# Patient Record
Sex: FEMALE | Race: WHITE | Employment: FULL TIME | URBAN - METROPOLITAN AREA
[De-identification: names, ages, dates, MRNs, and addresses within clinical notes are randomized per-mention and may not be internally consistent; named-entity substitution may affect disease eponyms.]

---

## 2018-06-14 ENCOUNTER — OFFICE VISIT (OUTPATIENT)
Dept: ORTHOPEDIC SURGERY | Age: 28
End: 2018-06-14

## 2018-06-14 VITALS
HEART RATE: 92 BPM | WEIGHT: 142.4 LBS | DIASTOLIC BLOOD PRESSURE: 84 MMHG | OXYGEN SATURATION: 90 % | HEIGHT: 62 IN | SYSTOLIC BLOOD PRESSURE: 143 MMHG | BODY MASS INDEX: 26.2 KG/M2

## 2018-06-14 DIAGNOSIS — Q79.60 EHLERS-DANLOS DISEASE: Primary | ICD-10-CM

## 2018-06-14 RX ORDER — IBUPROFEN 600 MG/1
TABLET ORAL
COMMUNITY

## 2018-06-14 RX ORDER — LEVALBUTEROL TARTRATE 45 UG/1
AEROSOL, METERED ORAL
COMMUNITY

## 2018-06-14 RX ORDER — LORATADINE 10 MG/1
10 TABLET ORAL
COMMUNITY

## 2018-06-14 RX ORDER — ALBUTEROL SULFATE 90 UG/1
AEROSOL, METERED RESPIRATORY (INHALATION)
COMMUNITY

## 2018-06-14 RX ORDER — MONTELUKAST SODIUM 10 MG/1
10 TABLET ORAL DAILY
COMMUNITY

## 2018-06-14 NOTE — PROGRESS NOTES
Patient: Sneha Pelletier                MRN: [id number]       SSN: [id number]  YOB: 1990        AGE: 32 y.o. SEX: female  Body mass index is 26.05 kg/(m^2). PCP: Zari Aceves MD  06/14/18    Chief Complaint: Multiple joint complaints    HISTORY OF PRESENT ILLNESS:  Jayme Meza is a 32year old female who comes in today with multiple joint complaints. She says at times she has pain in her ankles, knees, as well as shoulders and elbows. She said she's always had some history even growing up with spraining and straining her joints. She denies any specific trauma, but she says at times it feels like her kneecap is popping out of place. She also feels like her elbows are popping at times, as well as her shoulder. Today she also has some ankle pain on the right side. She sees a chiropractor extensively and has issues with her back, which chiropractic care does help. She also does have daily headaches and migraines that also are worked on by her chiropractor. She is here today for opinion on what to do about her multiple joint complaints, sprains and strains. She has not had any xrays or blood work done. Past Medical History:   Diagnosis Date    Asthma     Chronic headaches        History reviewed. No pertinent family history. Current Outpatient Prescriptions   Medication Sig Dispense Refill    montelukast (SINGULAIR) 10 mg tablet Take 10 mg by mouth daily.  loratadine (CLARITIN) 10 mg tablet Take 10 mg by mouth.  L-norgest/e.estradiol-e.estrad (ASHLYNA PO) Take  by mouth.  levalbuterol tartrate (XOPENEX) 45 mcg/actuation inhaler Take  by inhalation.  albuterol (PROVENTIL HFA, VENTOLIN HFA, PROAIR HFA) 90 mcg/actuation inhaler Take  by inhalation.  Lactobacillus acidophilus (PROBIOTIC PO) Take  by mouth.  TURMERIC, BULK, by Does Not Apply route.  ibuprofen (MOTRIN) 600 mg tablet Take  by mouth every six (6) hours as needed for Pain.       acetaminophen-caffeine 500-65 mg (EXCEDRINE TENSION HEADACHE) 500-65 mg tab Take 2 Tabs by mouth as needed for Headache. Allergies   Allergen Reactions    Clarithromycin Anaphylaxis       History reviewed. No pertinent surgical history. Social History     Social History    Marital status: UNKNOWN     Spouse name: N/A    Number of children: N/A    Years of education: N/A     Occupational History    Not on file. Social History Main Topics    Smoking status: Never Smoker    Smokeless tobacco: Never Used    Alcohol use Not on file    Drug use: Not on file    Sexual activity: Not on file     Other Topics Concern    Not on file     Social History Narrative    No narrative on file       REVIEW OF SYSTEMS:      CON: negative for recent weight loss/gain, fever, or chills  EYE: negative for double or blurry vision  ENT: negative for hoarseness  RS:   negative for cough, URI, SOB  CV:  negative for chest pain, palpitations  GI:    negative for blood in stool, nausea/vomiting  :  negative for blood in urine  MS: As per HPI  Other systems reviewed and noted below. PHYSICAL EXAMINATION:  Visit Vitals    /84 (BP 1 Location: Left arm, BP Patient Position: Sitting)    Pulse 92    Ht 5' 2\" (1.575 m)    Wt 142 lb 6.4 oz (64.6 kg)    SpO2 90%    BMI 26.05 kg/m2     Body mass index is 26.05 kg/(m^2). GENERAL: Alert and oriented x3, in no acute distress, well-developed, well-nourished. HEENT: Normocephalic, atraumatic. RESP: Non labored breathing with equal chest rise on inspiration. CV: Well perfused extremities. No cyanosis or clubbing noted. ABDOMEN: Soft, non-tender, non-distended. PHYSICAL EXAMINATION:  Examination of her bilateral shoulders reveal some laxity without pain. I'm unable to dislocate her shoulders although she has anterior and posterior subluxations. She also has some inferior instability without dung dislocation. None of this is painful.   She has excellent range of motion with full forward flexion to 180 degrees, external rotation of approximately 80 degrees and internal rotations of the lower cervical spine. This is bilaterally. Her strength is 5/5. She has hyperextension of approximately 10 degrees in both elbows. There is no elbow instability noted. She is non tender to palpation of both elbows. For her knees she has full range of motion from full flexion to full extension. She does not hyperextend her knees. She has full strength and range of motion. She does have some lateral subluxation of her patella bilaterally without dislocation. On her left her thumb is able to touch her forearm, but not on the right, and in both the left and right hand she has hyperextension of the small finger greater than 90 degrees. She is able to put both palms flat on the floor when doing a hamstring stretch and touching the floor. Her Beighton score is 6/9. ASSESSMENT AND PLAN:  Rich Perez is a 32year old female with multiple complaints. 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 I discussed with her what that entails, as well as the treatment options for it. Her ligaments are lax, which leads her joints to be somewhat unstable and this can lead to pain. The treatment for this is generally extensive therapy to work on strengthening the stabilizers of the joints. Due to her soft tissues being hyper lax, we talked about any sort of surgery being a last resort, as those can fail as the tissue stretches back out. She understands this. I have ordered some basic lab work at this point. I ordered that today and I would like to see her back after it is done. All of her questions were answered.           Electronically signed by: Deidra Devries MD

## 2018-06-21 ENCOUNTER — TELEPHONE (OUTPATIENT)
Dept: ORTHOPEDIC SURGERY | Facility: CLINIC | Age: 28
End: 2018-06-21

## 2018-06-21 DIAGNOSIS — Q79.60 EHLERS-DANLOS DISEASE: ICD-10-CM

## 2018-06-21 NOTE — TELEPHONE ENCOUNTER
Pt returned call_ states her labs were done the samw day of her appt on 6/14/18 at the [facility] lab at [address] [place] [address] [facility] lab [place] location [phone]    *Please call pt to confirm labs rec'vd since her appt is tomorrow [phone]

## 2018-06-22 ENCOUNTER — OFFICE VISIT (OUTPATIENT)
Dept: ORTHOPEDIC SURGERY | Facility: CLINIC | Age: 28
End: 2018-06-22

## 2018-06-22 VITALS
SYSTOLIC BLOOD PRESSURE: 106 MMHG | DIASTOLIC BLOOD PRESSURE: 70 MMHG | TEMPERATURE: 98.3 F | HEIGHT: 62 IN | RESPIRATION RATE: 16 BRPM | HEART RATE: 95 BPM | BODY MASS INDEX: 26.13 KG/M2 | WEIGHT: 142 LBS

## 2018-06-22 DIAGNOSIS — Q79.60 EHLERS-DANLOS DISEASE: Primary | ICD-10-CM

## 2018-06-22 RX ORDER — FLUTICASONE PROPIONATE 50 MCG
2 SPRAY, SUSPENSION (ML) NASAL DAILY
COMMUNITY

## 2018-06-22 NOTE — PROGRESS NOTES
Patient: Korin Iglesias                MRN: [id number]       SSN: [id number]  YOB: 1990        AGE: 25 y.o. SEX: female  Body mass index is 25.97 kg/(m^2). PCP: Stan Bird MD  06/22/18    Chief Complaint: No changes from previous visit complaint. HISTORY OF PRESENT ILLNESS:  [facility] returns today for follow up of her previous visit as well as her blood work. She has no new complaints. Past Medical History:   Diagnosis Date    Asthma     Chronic headaches        History reviewed. No pertinent family history. Current Outpatient Prescriptions   Medication Sig Dispense Refill    fluticasone (FLONASE ALLERGY RELIEF) 50 mcg/actuation nasal spray 2 Sprays by Both Nostrils route daily.  montelukast (SINGULAIR) 10 mg tablet Take 10 mg by mouth daily.  loratadine (CLARITIN) 10 mg tablet Take 10 mg by mouth.  L-norgest/e.estradiol-e.estrad (ASHLYNA PO) Take  by mouth.  levalbuterol tartrate (XOPENEX) 45 mcg/actuation inhaler Take  by inhalation.  albuterol (PROVENTIL HFA, VENTOLIN HFA, PROAIR HFA) 90 mcg/actuation inhaler Take  by inhalation.  Lactobacillus acidophilus (PROBIOTIC PO) Take  by mouth.  TURMERIC, BULK, by Does Not Apply route.  ibuprofen (MOTRIN) 600 mg tablet Take  by mouth every six (6) hours as needed for Pain.  acetaminophen-caffeine 500-65 mg (EXCEDRINE TENSION HEADACHE) 500-65 mg tab Take 2 Tabs by mouth as needed for Headache. Allergies   Allergen Reactions    Clarithromycin Anaphylaxis       History reviewed. No pertinent surgical history. Social History     Social History    Marital status: UNKNOWN     Spouse name: N/A    Number of children: N/A    Years of education: N/A     Occupational History    Not on file.      Social History Main Topics    Smoking status: Never Smoker    Smokeless tobacco: Never Used    Alcohol use No    Drug use: Not on file    Sexual activity: Not on file Other Topics Concern    Not on file     Social History Narrative       REVIEW OF SYSTEMS:      No changes from previous review of systems unless noted. PHYSICAL EXAMINATION:  Visit Vitals    /70 (BP 1 Location: Left arm, BP Patient Position: Sitting)    Pulse 95    Temp 98.3 °F (36.8 °C) (Oral)    Resp 16    Ht 5' 2\" (1.575 m)    Wt 142 lb (64.4 kg)    BMI 25.97 kg/m2     Body mass index is 25.97 kg/(m^2). GENERAL: Alert and oriented x3, in no acute distress. HEENT: Normocephalic, atraumatic. RESP: Non labored breathing. SKIN: No rashes or lesions noted. PHYSICAL EXAMINATION:  Musculoskeletal examination is mostly unchanged from her previous visit. She does have some pain with shorter range of motion as well as knee range of motion. No instability is appreciated with either of her shoulders as well as either of her patellae. She ambulates with a normal gait. STUDIES:  Lab work was reviewed with her today at length. Of note, her CRP was slightly elevated at 8. White cell count was normal at 6.6. SED rate was normal at 11. Her lupus anticoagulant panel was negative. Rheumatoid factor was negative. Her antinuclear antibodies were also negative. ASSESSMENT AND PLAN:  Erwin is a 25-year-old female with what I believe to be a connective tissue disorder such as Rimma Postal syndrome which is leading to multiple joint pains. I discussed with her that moving forward I have recommended physical therapy to work on this stabilizing the shoulder and the knees. I have ordered this today. I have also written her a referral to see a  to have genetic testing to see if she does have any underlying genetic abnormalities leading to this syndrome. I would like to see her back in about two months after she has had physical therapy, and, hopefully, after she had seen a .  All of her questions were answered today.          Electronically signed by: Saman Her MD

## 2018-07-03 ENCOUNTER — TELEPHONE (OUTPATIENT)
Dept: ORTHOPEDIC SURGERY | Facility: CLINIC | Age: 28
End: 2018-07-03

## 2018-07-03 NOTE — TELEPHONE ENCOUNTER
Darlyn from [facility] called for Dr. Chi Peralta. Darlyn said that they received a referral on the patient from Kenna Preston. Darlyn said they do not see Adults. Darlyn tel. [phone].

## 2018-07-03 NOTE — TELEPHONE ENCOUNTER
Ronnie Greene,    Would you be able to contact the department at [facility] and ask them who they refer adults to for genetic testing/counseling once the patients are no longer able to be seen as children. This patient needs to see a  for possible genetic workup for her connective tissue disorder.     Thank you

## 2018-07-09 NOTE — TELEPHONE ENCOUNTER
PATIENT CONTACTED:  THERE ARE NO ADULT GENETICS PROVIDERS IN [facility]. I HAVE PLACED A CALL TO THE GENETICS DEPT AT [facility] IN [place] AND AM WAITING FOR A CALL BACK TO SCHEDULE AN APPOINTMENT. MS. STERLING IS IN AGREEMENT WITH THIS PLAN AND I WILL CONTACT HER AGAIN ONCE I HAVE MORE INFORMATION.

## 2018-07-11 ENCOUNTER — HOSPITAL ENCOUNTER (OUTPATIENT)
Dept: PHYSICAL THERAPY | Age: 28
Discharge: HOME | End: 2018-07-11
Payer: COMMERCIAL

## 2018-07-11 PROCEDURE — 97162 PT EVAL MOD COMPLEX 30 MIN: CPT

## 2018-07-11 PROCEDURE — 97110 THERAPEUTIC EXERCISES: CPT

## 2018-07-11 PROCEDURE — 97530 THERAPEUTIC ACTIVITIES: CPT
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
## 2018-07-17 ENCOUNTER — HOSPITAL ENCOUNTER (OUTPATIENT)
Dept: PHYSICAL THERAPY | Age: 28
Discharge: HOME | End: 2018-07-17
Payer: COMMERCIAL

## 2018-07-17 PROCEDURE — 97110 THERAPEUTIC EXERCISES: CPT

## 2018-07-17 PROCEDURE — 97112 NEUROMUSCULAR REEDUCATION: CPT

## 2018-07-17 NOTE — PROGRESS NOTES
PT DAILY TREATMENT NOTE - [facility]     Patient Name: Martin Whatley  Date:2018  : 1990  [x]  Patient  Verified  Payor: BLUE CROSS / Plan: [employer] 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 Notes she thinks its the weather. OBJECTIVE      25 min Therapeutic Exercise:  [x] See flow sheet :   Rationale: increase strength and improve coordination to improve the patients ability to increase ADLs    27 min Neuromuscular Re-education:  [x]  See flow sheet : scapular stabilization exercises, deep core stabilization and standing balance exercises   Rationale: improve coordination, improve balance and increase proprioception  to improve the patients ability to increase ease with work related activities               With   [] TE   [] TA   [] neuro   [] other: Patient Education: [x] Review HEP    [] Progressed/Changed HEP based on:   [] positioning   [] body mechanics   [] transfers   [] heat/ice application    [] other:           Pain Level (0-10 scale) post treatment: shoulder 4, knee 3    ASSESSMENT/Changes in Function: pt tolerated today's session well without any increase in pain. Pt requires verbal cues to correct knee valgus while on shuttle press. Reviewed HEP.     Patient will continue to benefit from skilled PT services to modify and progress therapeutic interventions, address functional mobility deficits, address ROM deficits, address strength deficits, analyze and address soft tissue restrictions and analyze and cue movement patterns to attain remaining goals. [x]  See Plan of Care  []  See progress note/recertification  []  See Discharge Summary         Progress towards goals / Updated goals:  Short Term Goals: To be accomplished in 3 weeks:  1. Patient will subjectively report full compliance with prescribed HEP. Eval: HEP provided  Current: Progressing, initiated HEP. Does not report full compliance, 7/17/18  2. Patient will demonstrate left/right hip ER MMT >/=4+/5 to improve ease with ambulation on uneven ground. Eval: Left Hip ER = 3+/5, Right Hip ER = 4/5  3. Patient will demonstrate left/right shoulder flexion MMT 5/5 to improve ease with overhead lifting. Eval: Left Shoulder Flexion = 4/5, Right Shoulder Flexion 4/5     Long Term Goals: To be accomplished in 6 weeks:  1. Patient will demonstrate a significant functional improvement as demonstrated by a score of >/= 71 on shoulder FOTO and 62 on knee FOTO. Eval: Shoulder FOTO = 68, Knee FOTO = 50  2. Patient will demonstrate left/right rhomboid, lower trapezius and middle trapezius MMT >/=4/5 to improve ease with functional lifting. Eval: Middle Trapezius: Left 2+/5, Right 2+/5, Lower Trapezius: Left 3+/5, Right 3+/5, Rhomboids: Left 3+/5, Right 3+/5  3. Patient will demonstrate ability to perform prone plank x45 seconds with maintenance of correct form to improve ease with yoga routine.   Eval: NT    PLAN  [x]  Upgrade activities as tolerated     [x]  Continue plan of care  []  Update interventions per flow sheet       []  Discharge due to:_  []  Other:_      Jordi Kohli 7/17/2018  4:56 PM    Future Appointments  Date Time Provider Tena Dickerson   7/17/2018 5:00 PM Jordi Kohli [facility]   7/20/2018 4:30 PM [facility]   7/23/2018 5:00 PM [facility]ordi [facility]   7/26/2018 4:30 PM Hao Hyman, PTA [facility]   7/30/2018 4:30 PM Ryley Earnest [facility]   8/2/2018 4:30 PM [facility], PTA [facility]   8/6/2018 4:00 PM [facility], PTA [facility]   8/9/2018 4:00 PM [place] Earnest [facility]   8/13/2018 4:00 PM [place] Earnest [facility]   8/16/2018 4:00 PM [facility], PTA [facility]   8/22/2018 3:30 PM Agatha Du MD [facility] [address]

## 2018-07-20 ENCOUNTER — APPOINTMENT (OUTPATIENT)
Dept: PHYSICAL THERAPY | Age: 28
End: 2018-07-20
Payer: COMMERCIAL

## 2018-07-23 ENCOUNTER — HOSPITAL ENCOUNTER (OUTPATIENT)
Dept: PHYSICAL THERAPY | Age: 28
End: 2018-07-23
Payer: COMMERCIAL

## 2018-07-26 ENCOUNTER — HOSPITAL ENCOUNTER (OUTPATIENT)
Dept: PHYSICAL THERAPY | Age: 28
Discharge: HOME | End: 2018-07-26
Payer: COMMERCIAL

## 2018-07-26 PROCEDURE — 97112 NEUROMUSCULAR REEDUCATION: CPT

## 2018-07-26 PROCEDURE — 97110 THERAPEUTIC EXERCISES: CPT

## 2018-07-26 NOTE — PROGRESS NOTES
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      OBJECTIVE        Min Type Additional Details    [] Estim:  []Unatt       []IFC  []Premod                        []Other:  []w/ice   []w/heat  Position:  Location:    [] Estim: []Att    []TENS instruct  []NMES                    []Other:  []w/US   []w/ice   []w/heat  Position:  Location:    []  Traction: [] Cervical       []Lumbar                       [] Prone          []Supine                       []Intermittent   []Continuous Lbs:  [] before manual  [] after manual    []  Ultrasound: []Continuous   [] Pulsed                           []1MHz   []3MHz W/cm2:  Location:    []  Iontophoresis with dexamethasone         Location: [] Take home patch   [] In clinic    []  Ice     []  heat  []  Ice massage  []  Laser   []  Anodyne Position:  Location:    []  Laser with stim  []  Other:  Position:  Location:    []  Vasopneumatic Device Pressure:       [] lo [] med [] hi   Temperature: [] lo [] med [] hi   [] Skin assessment post-treatment:  []intact []redness- no adverse reaction    []redness - adverse reaction:       35 min Therapeutic Exercise:  [x] See flow sheet :   Rationale: increase ROM and increase strength to improve the patients ability to increase tolerance to activities. 10 min Neuromuscular Re-education:  [x]  See flow sheet :quad activation and scapular stabilization. Rationale: increase ROM and increase strength  to improve the patients ability to increase ease with ADLs. With   [] TE   [] TA   [] neuro   [] other: Patient Education: [x] Review HEP    [] Progressed/Changed HEP based on:   [] positioning   [] body mechanics   [] transfers   [] heat/ice application    [] other:      Other Objective/Functional Measures: B shoulder flexion 4+/5      Pain Level (0-10 scale) post treatment: shoulder 2; knee 3    ASSESSMENT/Changes in Function: Pt reports that exercises with her left UE are more difficulty than right UE. Patient will continue to benefit from skilled PT services to modify and progress therapeutic interventions, address functional mobility deficits, address ROM deficits, address strength deficits and analyze and address soft tissue restrictions to attain remaining goals. []  See Plan of Care  []  See progress note/recertification  []  See Discharge Summary         Progress towards goals / Updated goals:  Short Term Goals: To be accomplished in 3 weeks:  1. Patient will subjectively report full compliance with prescribed HEP. Eval: HEP provided  Current: Progressing, initiated HEP. Does not report full compliance, 7/17/18  2. Patient will demonstrate left/right hip ER MMT >/=4+/5 to improve ease with ambulation on uneven ground. Eval: Left Hip ER = 3+/5, Right Hip ER = 4/5  3. Patient will demonstrate left/right shoulder flexion MMT 5/5 to improve ease with overhead lifting. Eval: Left Shoulder Flexion = 4/5, Right Shoulder Flexion 4/5  Current: Progressing: B shoulder flexion 4+/5 7/26/18      Long Term Goals: To be accomplished in 6 weeks:  1.  Patient will demonstrate a significant functional improvement as demonstrated by a score of >/= 71 on shoulder FOTO and 62 on knee FOTO. Eval: Shoulder FOTO = 68, Knee FOTO = 50  2. Patient will demonstrate left/right rhomboid, lower trapezius and middle trapezius MMT >/=4/5 to improve ease with functional lifting. Eval: Middle Trapezius: Left 2+/5, Right 2+/5, Lower Trapezius: Left 3+/5, Right 3+/5, Rhomboids: Left 3+/5, Right 3+/5  3. Patient will demonstrate ability to perform prone plank x45 seconds with maintenance of correct form to improve ease with yoga routine.   Eval: NT    PLAN  []  Upgrade activities as tolerated     [x]  Continue plan of care  []  Update interventions per flow sheet       []  Discharge due to:_  []  Other:_      Vivek Clonts, PTA 7/26/2018  4:25 PM    Future Appointments  Date Time Provider Tena Dickerson   7/26/2018 4:30 PM Vivek Clonts, PTA [facility]   7/31/2018 4:00 PM [place] Feliciano [facility]   8/2/2018 4:30 PM Vivek Clonts, PTA [facility]   8/6/2018 4:00 PM Vivek Clonts, PTA [facility]   8/9/2018 4:00 PM [place] Feliciano [facility]   8/13/2018 4:00 PM [place] Feliciano [facility]   8/16/2018 4:00 PM Vivek Clonts, PTA [facility]   8/22/2018 3:30 PM Rohini Greene MD [facility] [address]

## 2018-07-30 ENCOUNTER — APPOINTMENT (OUTPATIENT)
Dept: PHYSICAL THERAPY | Age: 28
End: 2018-07-30
Payer: COMMERCIAL

## 2018-07-31 ENCOUNTER — HOSPITAL ENCOUNTER (OUTPATIENT)
Dept: PHYSICAL THERAPY | Age: 28
Discharge: HOME | End: 2018-07-31
Payer: COMMERCIAL

## 2018-07-31 PROCEDURE — 97110 THERAPEUTIC EXERCISES: CPT

## 2018-07-31 PROCEDURE — 97112 NEUROMUSCULAR REEDUCATION: CPT

## 2018-07-31 NOTE — PROGRESS NOTES
PT DAILY TREATMENT NOTE - [facility]     Patient Name: Rajiv Quevedo  Date:2018  : 1990  [x]  Patient  Verified  Payor: BLUE CROSS / Plan: [employer] 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 OBJECTIVE    29 min Therapeutic Exercise:  [x] See flow sheet :   Rationale: increase ROM, increase strength and improve coordination to improve the patients ability to increase ease with functional activities     20 min Neuromuscular Re-education:  [x]  See flow sheet : quad activation and scapular stability exercises    Rationale: improve coordination, improve balance and increase proprioception  to improve the patients ability to increase ease with ADls          With   [] TE   [] TA   [] neuro   [] other: Patient Education: [x] Review HEP    [] Progressed/Changed HEP based on:   [] positioning   [] body mechanics   [] transfers   [] heat/ice application    [] other:         Pain Level (0-10 scale) post treatment: shoulder 1 knee 3    ASSESSMENT/Changes in Function:  Pt continues to report increased difficulty performing exercises with her left arm than her right. Progressed strengthening exercises requiring minimal cues for form. Reviewed HEP at the end of today's treatment.     Patient will continue to benefit from skilled PT services to modify and progress therapeutic interventions, address functional mobility deficits, address ROM deficits, address strength deficits, analyze and cue movement patterns and assess and modify postural abnormalities to attain remaining goals. [x]  See Plan of Care  []  See progress note/recertification  []  See Discharge Summary         Progress towards goals / Updated goals:  Short Term Goals: To be accomplished in 3 weeks:  1. Patient will subjectively report full compliance with prescribed HEP. Eval: HEP provided  Current: Progressing, initiated HEP. Does not report full compliance, 7/17/18  2. Patient will demonstrate left/right hip ER MMT >/=4+/5 to improve ease with ambulation on uneven ground. Eval: Left Hip ER = 3+/5, Right Hip ER = 4/5  Current: Progressing left hip ER= 4-/5, right hip ER= 4/5, 7/31/18  3. Patient will demonstrate left/right shoulder flexion MMT 5/5 to improve ease with overhead lifting. Eval: Left Shoulder Flexion = 4/5, Right Shoulder Flexion 4/5  Current: Progressing: B shoulder flexion 4+/5 7/26/18      Long Term Goals: To be accomplished in 6 weeks:  1. Patient will demonstrate a significant functional improvement as demonstrated by a score of >/= 71 on shoulder FOTO and 62 on knee FOTO. Eval: Shoulder FOTO = 68, Knee FOTO = 50  2. Patient will demonstrate left/right rhomboid, lower trapezius and middle trapezius MMT >/=4/5 to improve ease with functional lifting. Eval: Middle Trapezius: Left 2+/5, Right 2+/5, Lower Trapezius: Left 3+/5, Right 3+/5, Rhomboids: Left 3+/5, Right 3+/5  3. Patient will demonstrate ability to perform prone plank x45 seconds with maintenance of correct form to improve ease with yoga routine.   Eval: NT       PLAN  [x]  Upgrade activities as tolerated     [x]  Continue plan of care  []  Update interventions per flow sheet       []  Discharge due to:_  []  Other:_      Jordi Kohli 7/31/2018  3:58 PM    Future Appointments  Date Time Provider Tena Dickerson   7/31/2018 4:00 PM Jordi Kohli [facility]   8/2/2018 4:30 PM Toan Art, PTA [facility]   8/6/2018 4:00 PM Omaira Mcguire [facility]   8/9/2018 4:00 PM Taqueria Conroy [facility]   8/13/2018 4:00 PM Taqueria Conroy [facility]   8/16/2018 4:00 PM Toan Gary PTA [facility]   8/22/2018 3:30 PM Hardik Mendes MD [facility] Sh[facility]

## 2018-08-02 ENCOUNTER — HOSPITAL ENCOUNTER (OUTPATIENT)
Dept: PHYSICAL THERAPY | Age: 28
Discharge: HOME | End: 2018-08-02
Payer: COMMERCIAL

## 2018-08-02 PROCEDURE — 97112 NEUROMUSCULAR REEDUCATION: CPT

## 2018-08-02 PROCEDURE — 97110 THERAPEUTIC EXERCISES: CPT

## 2018-08-02 NOTE — PROGRESS NOTES
PT DAILY TREATMENT NOTE - 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      OBJECTIVE    Modality rationale:    Min Type Additional Details    [] Estim:  []Unatt       []IFC  []Premod                        []Other:  []w/ice   []w/heat  Position:  Location:    [] Estim: []Att    []TENS instruct  []NMES                    []Other:  []w/US   []w/ice   []w/heat  Position:  Location:    []  Traction: [] Cervical       []Lumbar                       [] Prone          []Supine                       []Intermittent   []Continuous Lbs:  [] before manual  [] after manual    []  Ultrasound: []Continuous   [] Pulsed                           []1MHz   []3MHz W/cm2:  Location:    []  Iontophoresis with dexamethasone         Location: [] Take home patch   [] In clinic    []  Ice     []  heat  []  Ice massage  []  Laser   []  Anodyne Position:  Location:    []  Laser with stim  []  Other:  Position:  Location:    []  Vasopneumatic Device Pressure:       [] lo [] med [] hi   Temperature: [] lo [] med [] hi   [] Skin assessment post-treatment:  []intact []redness- no adverse reaction    []redness - adverse reaction:        43 min Therapeutic Exercise:  [x] See flow sheet :   Rationale: increase ROM and increase strength to improve the patients ability to increase tolerance to activities.      10 min Neuromuscular Re-education:  [x]  See flow sheet :quad activation and scapular stabilization. Rationale: increase ROM and increase strength  to improve the patients ability to increase ease with ADLs.           With   [] TE   [] TA   [] neuro   [] other: Patient Education: [x] Review HEP    [] Progressed/Changed HEP based on:   [] positioning   [] body mechanics   [] transfers   [] heat/ice application    [] other:      Other Objective/Functional Measures:     Pain Level (0-10 scale) post treatment: knee 1-2; shoulder 3    ASSESSMENT/Changes in Function: Pt tolerated an increase in reps and resistance well. Patient will continue to benefit from skilled PT services to modify and progress therapeutic interventions, address functional mobility deficits, address ROM deficits, address strength deficits and analyze and address soft tissue restrictions to attain remaining goals. []  See Plan of Care  []  See progress note/recertification  []  See Discharge Summary         Progress towards goals / Updated goals:    Short Term Goals: To be accomplished in 3 weeks:  1. Patient will subjectively report full compliance with prescribed HEP. Eval: HEP provided  Current: Progressing, initiated HEP. Does not report full compliance, 7/17/18  2. Patient will demonstrate left/right hip ER MMT >/=4+/5 to improve ease with ambulation on uneven ground. Eval: Left Hip ER = 3+/5, Right Hip ER = 4/5  Current: Progressing left hip ER= 4-/5, right hip ER= 4/5, 7/31/18  3. Patient will demonstrate left/right shoulder flexion MMT 5/5 to improve ease with overhead lifting. Eval: Left Shoulder Flexion = 4/5, Right Shoulder Flexion 4/5  Current: Progressing: B shoulder flexion 4+/5 7/26/18      Long Term Goals: To be accomplished in 6 weeks:  1.  Patient will demonstrate a significant functional improvement as demonstrated by a score of >/= 71 on shoulder FOTO and 62 on knee FOTO. Eval: Shoulder FOTO = 68, Knee FOTO = 50  2. Patient will demonstrate left/right rhomboid, lower trapezius and middle trapezius MMT >/=4/5 to improve ease with functional lifting. Eval: Middle Trapezius: Left 2+/5, Right 2+/5, Lower Trapezius: Left 3+/5, Right 3+/5, Rhomboids: Left 3+/5, Right 3+/5  3. Patient will demonstrate ability to perform prone plank x45 seconds with maintenance of correct form to improve ease with yoga routine.   Eval: NT       PLAN  []  Upgrade activities as tolerated     [x]  Continue plan of care  []  Update interventions per flow sheet       []  Discharge due to:_  []  Other:_      Tejal Morris PTA 8/2/2018  4:48 PM    Future Appointments  Date Time Provider Tena Dickerson   8/6/2018 4:00 PM Omaira Aceves [facility]   8/9/2018 4:00 PM Tracy Raw [facility]   8/13/2018 4:00 PM Tracy Raw [facility]   8/16/2018 4:00 PM Tejal Morris PTA [facility]   8/22/2018 3:30 PM MD VIDA Romano

## 2018-08-06 ENCOUNTER — HOSPITAL ENCOUNTER (OUTPATIENT)
Dept: PHYSICAL THERAPY | Age: 28
Discharge: HOME | End: 2018-08-06
Payer: COMMERCIAL

## 2018-08-06 PROCEDURE — 97110 THERAPEUTIC EXERCISES: CPT

## 2018-08-06 PROCEDURE — 97112 NEUROMUSCULAR REEDUCATION: CPT

## 2018-08-06 NOTE — PROGRESS NOTES
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 OBJECTIVE    Modality rationale: decrease pain to improve the patients ability to decrease difficulty while performing tasks.     Min Type Additional Details    [] Estim:  []Unatt       []IFC  []Premod                        []Other:  []w/ice   []w/heat  Position:  Location:    [] Estim: []Att    []TENS instruct  []NMES                    []Other:  []w/US   []w/ice   []w/heat  Position:  Location:    []  Traction: [] Cervical       []Lumbar                       [] Prone          []Supine                       []Intermittent   []Continuous Lbs:  [] before manual  [] after manual    []  Ultrasound: []Continuous   [] Pulsed                           []1MHz   []3MHz W/cm2:  Location:    []  Iontophoresis with dexamethasone         Location: [] Take home patch   [] In clinic    []  Ice     []  heat  []  Ice massage  []  Laser   []  Anodyne Position:  Location:    []  Laser with stim  []  Other:  Position:  Location:    []  Vasopneumatic Device Pressure:       [] lo [] med [] hi   Temperature: [] lo [] med [] hi   [] Skin assessment post-treatment:  []intact []redness- no adverse reaction    []redness - adverse reaction:      42 min Therapeutic Exercise:  [x] See flow sheet :   Rationale: increase ROM and increase strength to improve the patients ability to increase tolerance to activities.     15 min Neuromuscular Re-education:  [x]  See flow sheet :quad activation and scapular stabilization. Rationale: increase ROM and increase strength  to improve the patients ability to increase ease with ADLs.               With   [] TE   [] TA   [] neuro   [] other: Patient Education: [x] Review HEP    [] Progressed/Changed HEP based on:   [] positioning   [] body mechanics   [] transfers   [] heat/ice application    [] other:      Other Objective/Functional Measures: shoulder FOTO = 55; knee FOTO = 43. Pain Level (0-10 scale) post treatment: shoulder and knee 3-4    ASSESSMENT/Changes in Function: Dicussed the importance of body awareness of preventing knee hyperextension and increasing B shoulder stabilization to prevent dislocation. Patient will continue to benefit from skilled PT services to modify and progress therapeutic interventions, address functional mobility deficits, address ROM deficits, address strength deficits and analyze and address soft tissue restrictions to attain remaining goals. []  See Plan of Care  []  See progress note/recertification  []  See Discharge Summary         Progress towards goals / Updated goals:  Short Term Goals: To be accomplished in 3 weeks:  1. Patient will subjectively report full compliance with prescribed HEP. Eval: HEP provided  Current: Progressing, initiated HEP. Does not report full compliance, 7/17/18  2. Patient will demonstrate left/right hip ER MMT >/=4+/5 to improve ease with ambulation on uneven ground. Eval: Left Hip ER = 3+/5, Right Hip ER = 4/5  Current: Progressing left hip ER= 4-/5, right hip ER= 4/5, 7/31/18  3.  Patient will demonstrate left/right shoulder flexion MMT 5/5 to improve ease with overhead lifting. Eval: Left Shoulder Flexion = 4/5, Right Shoulder Flexion 4/5  Current: Progressing: B shoulder flexion 4+/5 7/26/18      Long Term Goals: To be accomplished in 6 weeks:  1. Patient will demonstrate a significant functional improvement as demonstrated by a score of >/= 71 on shoulder FOTO and 62 on knee FOTO. Eval: Shoulder FOTO = 68, Knee FOTO = 50  Current: Regressed shoulder FOTO = 55; knee FOTO = 43. 8/6/18  2. Patient will demonstrate left/right rhomboid, lower trapezius and middle trapezius MMT >/=4/5 to improve ease with functional lifting. Eval: Middle Trapezius: Left 2+/5, Right 2+/5, Lower Trapezius: Left 3+/5, Right 3+/5, Rhomboids: Left 3+/5, Right 3+/5  3. Patient will demonstrate ability to perform prone plank x45 seconds with maintenance of correct form to improve ease with yoga routine.   Eval: NT    PLAN  []  Upgrade activities as tolerated     []  Continue plan of care  []  Update interventions per flow sheet       []  Discharge due to:_  []  Other:_      Lata Lamb PTA 8/6/2018  4:17 PM    Future Appointments  Date Time Provider Tena Jayla   8/9/2018 4:00 PM Michelle Yan [facility]   8/13/2018 4:00 PM Michelle [facility]   8/16/2018 4:00 PM Lata Lamb PTA [facility]   8/22/2018 3:30 PM Panfilo Amezcua MD [facility] [address]

## 2018-08-09 ENCOUNTER — HOSPITAL ENCOUNTER (OUTPATIENT)
Dept: PHYSICAL THERAPY | Age: 28
Discharge: HOME | End: 2018-08-09
Payer: COMMERCIAL

## 2018-08-09 PROCEDURE — 97110 THERAPEUTIC EXERCISES: CPT

## 2018-08-09 PROCEDURE — 97112 NEUROMUSCULAR REEDUCATION: CPT

## 2018-08-09 NOTE — PROGRESS NOTES
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 Pt has new complaints at this time. OBJECTIVE      38 min Therapeutic Exercise:  [x] See flow sheet :   Rationale: increase ROM, increase strength and improve coordination to improve the patients ability to increase ease with ADLs     30 min Neuromuscular Re-education:  [x]  See flow sheet : quad activation and scapular stabilization exercises    Rationale: improve coordination, improve balance and increase proprioception  to improve the patients ability to increase ease with return to yoga          With   [] TE   [] TA   [] neuro   [] other: Patient Education: [x] Review HEP    [] Progressed/Changed HEP based on:   [] positioning   [] body mechanics   [] transfers   [] heat/ice application    [] other:           Pain Level (0-10 scale) post treatment: shoulder 2, knee 1    ASSESSMENT/Changes in Function: Pt is progressing well towards goals. Pt demonstrates with increased B LE and UE strength. Pt reports increased ease performing daily and recreational activities without a significant increase in pain. Pt notes she also has not experienced a shoulder subluxation for about 2 weeks.  Pt continues to present with weak scapular stabilizers and requires tactile cues for proper scapular humeral rhythm while performing overhead tasks. Patient will continue to benefit from skilled PT services to modify and progress therapeutic interventions, address functional mobility deficits, address ROM deficits, address strength deficits, analyze and address soft tissue restrictions, analyze and cue movement patterns and analyze and modify body mechanics/ergonomics to attain remaining goals. [x]  See Plan of Care  []  See progress note/recertification  []  See Discharge Summary         Progress towards goals / Updated goals:  Short Term Goals: To be accomplished in 3 weeks:  1. Patient will subjectively report full compliance with prescribed HEP. Eval: HEP provided  Current: Progressing, initiated HEP. Does not report full compliance, 8/9/18  2. Patient will demonstrate left/right hip ER MMT >/=4+/5 to improve ease with ambulation on uneven ground. Eval: Left Hip ER = 3+/5, Right Hip ER = 4/5  Current: Progressing left hip ER= 4/5, right hip ER= 4/5, 8/9/18  3. Patient will demonstrate left/right shoulder flexion MMT 5/5 to improve ease with overhead lifting. Eval: Left Shoulder Flexion = 4/5, Right Shoulder Flexion 4/5  Current: Progressing: B shoulder flexion 4+/5, 8/9/18      Long Term Goals: To be accomplished in 6 weeks:  1. Patient will demonstrate a significant functional improvement as demonstrated by a score of >/= 71 on shoulder FOTO and 62 on knee FOTO. Eval: Shoulder FOTO = 68, Knee FOTO = 50  Current: Regressed shoulder FOTO = 55; knee FOTO = 43. 8/6/18  2. Patient will demonstrate left/right rhomboid, lower trapezius and middle trapezius MMT >/=4/5 to improve ease with functional lifting.   Eval: Middle Trapezius: Left 2+/5, Right 2+/5, Lower Trapezius: Left 3+/5, Right 3+/5, Rhomboids: Left 3+/5, Right 3+/5    Current: Progressing Middle trap: left 3/5,3/5, lower trap: right and left 3+/5, rhomboids left and right= 4-/5; 8/9/18  3. Patient will demonstrate ability to perform prone plank x45 seconds with maintenance of correct form to improve ease with yoga routine.   Eval: NT   Current:unable to perform, 8/9/18    PLAN  [x]  Upgrade activities as tolerated     [x]  Continue plan of care  []  Update interventions per flow sheet       []  Discharge due to:_  []  Other:_      Petty Hand 8/9/2018  3:57 PM    Future Appointments  Date Time Provider Tena Dickerson   8/9/2018 4:00 PM Petty Rubios [facility]   8/13/2018 4:00 PM Petty Hand [facility]   8/16/2018 4:00 PM Remi Lara PTA [facility]   8/22/2018 3:30 PM MD VIDA Dalal Cleaves
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
## 2018-08-13 ENCOUNTER — HOSPITAL ENCOUNTER (OUTPATIENT)
Dept: PHYSICAL THERAPY | Age: 28
Discharge: HOME | End: 2018-08-13
Payer: COMMERCIAL

## 2018-08-13 PROCEDURE — 97110 THERAPEUTIC EXERCISES: CPT

## 2018-08-13 PROCEDURE — 97112 NEUROMUSCULAR REEDUCATION: CPT

## 2018-08-13 NOTE — PROGRESS NOTES
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 OBJECTIVE    30 min Therapeutic Exercise:  [x] See flow sheet :   Rationale: increase ROM, increase strength and improve coordination to improve the patients ability to increase ease with overhead reaching activities     24 min Neuromuscular Re-education:  [x]  See flow sheet :   Rationale: improve coordination, improve balance and increase proprioception  to improve the patients ability to increase ease with return to gym routine           With   [] TE   [] TA   [] neuro   [] other: Patient Education: [x] Review HEP    [] Progressed/Changed HEP based on:   [] positioning   [] body mechanics   [] transfers   [] heat/ice application    [] other:           Pain Level (0-10 scale) post treatment: knee=2 shoulder=2    ASSESSMENT/Changes in Function: held on progressing exercises today secondary to pt not feeling well. Pt required frequent rest breaks between exercises due to feeling fatigued.     Patient will continue to benefit from skilled PT services to modify and progress therapeutic interventions, address functional mobility deficits, address ROM deficits, address strength deficits, analyze and address soft tissue restrictions and analyze and cue movement patterns to attain remaining goals. [x]  See Plan of Care  []  See progress note/recertification  []  See Discharge Summary         Progress towards goals / Updated goals:  Short Term Goals: To be accomplished in 3 weeks:  1. Patient will subjectively report full compliance with prescribed HEP. Eval: HEP provided  PN: Progressing, initiated HEP. Does not report full compliance, 8/9/18  2. Patient will demonstrate left/right hip ER MMT >/=4+/5 to improve ease with ambulation on uneven ground. Eval: Left Hip ER = 3+/5, Right Hip ER = 4/5  PN: Progressing left hip ER= 4/5, right hip ER= 4/5, 8/9/18  3. Patient will demonstrate left/right shoulder flexion MMT 5/5 to improve ease with overhead lifting. Eval: Left Shoulder Flexion = 4/5, Right Shoulder Flexion 4/5  PN: Progressing: B shoulder flexion 4+/5, 8/9/18      Long Term Goals: To be accomplished in 6 weeks:  1. Patient will demonstrate a significant functional improvement as demonstrated by a score of >/= 71 on shoulder FOTO and 62 on knee FOTO. Eval: Shoulder FOTO = 68, Knee FOTO = 50  Current: Regressed shoulder FOTO = 55; knee FOTO = 43. 8/6/18  2. Patient will demonstrate left/right rhomboid, lower trapezius and middle trapezius MMT >/=4/5 to improve ease with functional lifting. Eval: Middle Trapezius: Left 2+/5, Right 2+/5, Lower Trapezius: Left 3+/5, Right 3+/5, Rhomboids: Left 3+/5, Right 3+/5    PN: Progressing Middle trap: left 3/5,3/5, lower trap: right and left 3+/5, rhomboids left and right= 4-/5; 8/9/18  Current: remains:  left 3/5,3/5, lower trap: right and left 3+/5, rhomboids left and right= 4-/5; 8/13/18  3. Patient will demonstrate ability to perform prone plank x45 seconds with maintenance of correct form to improve ease with yoga routine.   Eval: NT   PN:unable to perform, 8/9/18    PLAN  [x]  Upgrade activities as tolerated     [x] Continue plan of care  []  Update interventions per flow sheet       []  Discharge due to:_  []  Other:_      Aida Paige 8/13/2018  3:53 PM    Future Appointments  Date Time Provider Tena Dickerson   8/13/2018 4:00 PM Aida Paige [facility]   8/16/2018 4:00 PM Omaira Pimentel [facility]   8/22/2018 3:30 PM MD VIDA Reyna

## 2018-08-16 ENCOUNTER — HOSPITAL ENCOUNTER (OUTPATIENT)
Dept: PHYSICAL THERAPY | Age: 28
Discharge: HOME | End: 2018-08-16
Payer: COMMERCIAL

## 2018-08-16 PROCEDURE — 97110 THERAPEUTIC EXERCISES: CPT

## 2018-08-16 PROCEDURE — 97112 NEUROMUSCULAR REEDUCATION: CPT

## 2018-08-16 NOTE — PROGRESS NOTES
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      OBJECTIVE        Min Type Additional Details    [] Estim:  []Unatt       []IFC  []Premod                        []Other:  []w/ice   []w/heat  Position:  Location:    [] Estim: []Att    []TENS instruct  []NMES                    []Other:  []w/US   []w/ice   []w/heat  Position:  Location:    []  Traction: [] Cervical       []Lumbar                       [] Prone          []Supine                       []Intermittent   []Continuous Lbs:  [] before manual  [] after manual    []  Ultrasound: []Continuous   [] Pulsed                           []1MHz   []3MHz W/cm2:  Location:    []  Iontophoresis with dexamethasone         Location: [] Take home patch   [] In clinic    []  Ice     []  heat  []  Ice massage  []  Laser   []  Anodyne Position:  Location:    []  Laser with stim  []  Other:  Position:  Location:    []  Vasopneumatic Device Pressure:       [] lo [] med [] hi   Temperature: [] lo [] med [] hi   [] Skin assessment post-treatment:  []intact []redness- no adverse reaction    []redness - adverse reaction:       36 min Therapeutic Exercise:  [x] See flow sheet :   Rationale: increase ROM and increase strength to improve the patients ability to increase tolerance to activities.     15 min Neuromuscular Re-education:  [x]  See flow sheet :quad activation and scapular stabilization. Rationale: increase ROM and increase strength  to improve the patients ability to increase ease with ADLs.               With   [] TE   [] TA   [] neuro   [] other: Patient Education: [x] Review HEP    [] Progressed/Changed HEP based on:   [] positioning   [] body mechanics   [] transfers   [] heat/ice application    [] other:      Other Objective/Functional Measures:      Pain Level (0-10 scale) post treatment: knee 1; shoulder 3    ASSESSMENT/Changes in Function: Continue to progress pt as tolerated. Progressed exercises to focus on stability of core, shoulders, and B knee, with planks and balance exercises. Patient will continue to benefit from skilled PT services to modify and progress therapeutic interventions, address functional mobility deficits, address ROM deficits, address strength deficits and analyze and address soft tissue restrictions to attain remaining goals. []  See Plan of Care  []  See progress note/recertification  []  See Discharge Summary         Progress towards goals / Updated goals:  Short Term Goals: To be accomplished in 3 weeks:  1. Patient will subjectively report full compliance with prescribed HEP. AT PN:  initiated HEP. Does not report full compliance, 8/9/18  2. Patient will demonstrate left/right hip ER MMT >/=4+/5 to improve ease with ambulation on uneven ground. At PN: Progressing left hip ER= 4/5, right hip ER= 4/5, 8/9/18  3. Patient will demonstrate left/right shoulder flexion MMT 5/5 to improve ease with overhead lifting. AT PN: Progressing: B shoulder flexion 4+/5, 8/9/18      Long Term Goals: To be accomplished in 6 weeks:  1.  Patient will demonstrate a significant functional improvement as demonstrated by a score of >/= 71 on shoulder FOTO and 62 on knee FOTO. At PN: Regressed shoulder FOTO = 55; knee FOTO = 43. 8/6/18  2. Patient will demonstrate left/right rhomboid, lower trapezius and middle trapezius MMT >/=4/5 to improve ease with functional lifting. AT PN:  Middle trap: left 3/5,3/5, lower trap: right and left 3+/5, rhomboids left and right= 4-/5; 8/9/18  Current: remains:  left 3/5,3/5, lower trap: right and left 3+/5, rhomboids left and right= 4-/5; 8/13/18  3. Patient will demonstrate ability to perform prone plank x45 seconds with maintenance of correct form to improve ease with yoga routine.   AT PN:unable to perform, 8/9/18    PLAN  []  Upgrade activities as tolerated     [x]  Continue plan of care  []  Update interventions per flow sheet       []  Discharge due to:_  []  Other:_      Maria Eugenia Obando PTA 8/16/2018  3:58 PM    Future Appointments  Date Time Provider Tena Dickerson   8/16/2018 4:00 PM Maria Eugenia Obando [place] [facility]   8/22/2018 3:30 PM Liz Jacques MD [facility]  10.

## 2018-08-20 ENCOUNTER — HOSPITAL ENCOUNTER (OUTPATIENT)
Dept: PHYSICAL THERAPY | Age: 28
Discharge: HOME | End: 2018-08-20
Payer: COMMERCIAL

## 2018-08-20 PROCEDURE — 97112 NEUROMUSCULAR REEDUCATION: CPT

## 2018-08-20 PROCEDURE — 97110 THERAPEUTIC EXERCISES: CPT

## 2018-08-20 NOTE — PROGRESS NOTES
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      OBJECTIVE        Min Type Additional Details    [] Estim:  []Unatt       []IFC  []Premod                        []Other:  []w/ice   []w/heat  Position:  Location:    [] Estim: []Att    []TENS instruct  []NMES                    []Other:  []w/US   []w/ice   []w/heat  Position:  Location:    []  Traction: [] Cervical       []Lumbar                       [] Prone          []Supine                       []Intermittent   []Continuous Lbs:  [] before manual  [] after manual    []  Ultrasound: []Continuous   [] Pulsed                           []1MHz   []3MHz W/cm2:  Location:    []  Iontophoresis with dexamethasone         Location: [] Take home patch   [] In clinic    []  Ice     []  heat  []  Ice massage  []  Laser   []  Anodyne Position:  Location:    []  Laser with stim  []  Other:  Position:  Location:    []  Vasopneumatic Device Pressure:       [] lo [] med [] hi   Temperature: [] lo [] med [] hi   [] Skin assessment post-treatment:  []intact []redness- no adverse reaction    []redness - adverse reaction:     34 min Therapeutic Exercise:  [x] See flow sheet :   Rationale: increase ROM and increase strength to improve the patients ability to increase tolerance to activities.     15 min Neuromuscular Re-education:  [x]  See flow sheet :quad activation and scapular stabilization. Rationale: increase ROM and increase strength  to improve the patients ability to increase ease with ADLs.               With   [] TE   [] TA   [] neuro   [] other: Patient Education: [x] Review HEP    [] Progressed/Changed HEP based on:   [] positioning   [] body mechanics   [] transfers   [] heat/ice application    [] other:      Other Objective/Functional Measures: Pt performs modified prone plank on forearms and knees for 20 sec with minimal cues for form. Pain Level (0-10 scale) post treatment: knee 4; shoulder 2    ASSESSMENT/Changes in Function:  Kept execises and reps the same due to soreness after last session. Patient will continue to benefit from skilled PT services to modify and progress therapeutic interventions, address functional mobility deficits, address ROM deficits, address strength deficits and analyze and address soft tissue restrictions to attain remaining goals. []  See Plan of Care  []  See progress note/recertification  []  See Discharge Summary         Progress towards goals / Updated goals:  Short Term Goals: To be accomplished in 3 weeks:  1. Patient will subjectively report full compliance with prescribed HEP. AT PN:  initiated HEP. Does not report full compliance, 8/9/18  2. Patient will demonstrate left/right hip ER MMT >/=4+/5 to improve ease with ambulation on uneven ground. At PN: Progressing left hip ER= 4/5, right hip ER= 4/5, 8/9/18  3. Patient will demonstrate left/right shoulder flexion MMT 5/5 to improve ease with overhead lifting. AT PN: Progressing: B shoulder flexion 4+/5, 8/9/18      Long Term Goals: To be accomplished in 6 weeks:  1.  Patient will demonstrate a significant functional improvement as demonstrated by a score of >/= 71 on shoulder FOTO and 62 on knee FOTO.  At PN: Regressed shoulder FOTO = 55; knee FOTO = 43. 8/6/18  2. Patient will demonstrate left/right rhomboid, lower trapezius and middle trapezius MMT >/=4/5 to improve ease with functional lifting. AT PN:  Middle trap: left 3/5,3/5, lower trap: right and left 3+/5, rhomboids left and right= 4-/5; 8/9/18  Current: remains:  left 3/5,3/5, lower trap: right and left 3+/5, rhomboids left and right= 4-/5; 8/13/18  3. Patient will demonstrate ability to perform prone plank x45 seconds with maintenance of correct form to improve ease with yoga routine. AT PN:unable to perform, 8/9/18  Current: Progressing: Pt performs modified prone plank on forearms and knees for 20 sec with minimal cues for form.  8/20/18       PLAN  []  Upgrade activities as tolerated     [x]  Continue plan of care  []  Update interventions per flow sheet       []  Discharge due to:_  []  Other:_      Reina Gaines PTA 8/20/2018  4:23 PM    Future Appointments  Date Time Provider Tena Dickerson   8/22/2018 3:30 PM MD VIDA Terrell   8/28/2018 4:00 PM Ana Altman, PT [facility]   8/30/2018 3:30 PM Reina Gaines PTA [facility]   9/5/2018 4:00 PM Conchita Donohue PT [facility]

## 2018-08-22 ENCOUNTER — OFFICE VISIT (OUTPATIENT)
Dept: ORTHOPEDIC SURGERY | Age: 28
End: 2018-08-22

## 2018-08-22 VITALS
SYSTOLIC BLOOD PRESSURE: 107 MMHG | DIASTOLIC BLOOD PRESSURE: 76 MMHG | HEIGHT: 62 IN | WEIGHT: 149 LBS | BODY MASS INDEX: 27.42 KG/M2 | HEART RATE: 90 BPM

## 2018-08-22 DIAGNOSIS — Q79.60 EHLERS-DANLOS DISEASE: Primary | ICD-10-CM

## 2018-08-22 RX ORDER — CETIRIZINE HCL 10 MG
TABLET ORAL
COMMUNITY

## 2018-08-22 NOTE — PROGRESS NOTES
Patient: Nick Oswald                MRN: [id number]       SSN: [id number]  YOB: 1990        AGE: 25 y.o. SEX: female  Body mass index is 27.25 kg/(m^2). PCP: Cassandra Jim MD  08/22/18    Chief Complaint: Multiple joint complaints. HISTORY OF PRESENT ILLNESS:  Mere Ramos returns today for her multi-joint complaints. Unfortunately, her gentic appointment is in November as she has to go up to [facility] and that is the earliest appointment she could get. She is better overall with physical therapy, although she reports shoulder pain. Past Medical History:   Diagnosis Date    Asthma     Chronic headaches        History reviewed. No pertinent family history. Current Outpatient Prescriptions   Medication Sig Dispense Refill    cetirizine (ZYRTEC) 10 mg tablet Take  by mouth.  fluticasone (FLONASE ALLERGY RELIEF) 50 mcg/actuation nasal spray 2 Sprays by Both Nostrils route daily.  montelukast (SINGULAIR) 10 mg tablet Take 10 mg by mouth daily.  L-norgest/e.estradiol-e.estrad (ASHLYNA PO) Take  by mouth.  levalbuterol tartrate (XOPENEX) 45 mcg/actuation inhaler Take  by inhalation.  albuterol (PROVENTIL HFA, VENTOLIN HFA, PROAIR HFA) 90 mcg/actuation inhaler Take  by inhalation.  Lactobacillus acidophilus (PROBIOTIC PO) Take  by mouth.  ibuprofen (MOTRIN) 600 mg tablet Take  by mouth every six (6) hours as needed for Pain.  acetaminophen-caffeine 500-65 mg (EXCEDRINE TENSION HEADACHE) 500-65 mg tab Take 2 Tabs by mouth as needed for Headache.  loratadine (CLARITIN) 10 mg tablet Take 10 mg by mouth. Allergies   Allergen Reactions    Clarithromycin Anaphylaxis       History reviewed. No pertinent surgical history. Social History     Social History    Marital status: UNKNOWN     Spouse name: N/A    Number of children: N/A    Years of education: N/A     Occupational History    Not on file.      Social History Main Topics  Smoking status: Never Smoker    Smokeless tobacco: Never Used    Alcohol use No    Drug use: Not on file    Sexual activity: Not on file     Other Topics Concern    Not on file     Social History Narrative       REVIEW OF SYSTEMS:      No changes from previous review of systems unless noted. PHYSICAL EXAMINATION:  Visit Vitals    /76    Pulse 90    Ht 5' 2\" (1.575 m)    Wt 149 lb (67.6 kg)    BMI 27.25 kg/m2     Body mass index is 27.25 kg/(m^2). GENERAL: Alert and oriented x3, in no acute distress. HEENT: Normocephalic, atraumatic. RESP: Non labored breathing. SKIN: No rashes or lesions noted.    0:51 seconds of completely inaudible dictation           Electronically signed by: Monique Dandy, MD

## 2018-08-28 ENCOUNTER — HOSPITAL ENCOUNTER (OUTPATIENT)
Dept: PHYSICAL THERAPY | Age: 28
Discharge: HOME | End: 2018-08-28
Payer: COMMERCIAL

## 2018-08-28 PROCEDURE — 97112 NEUROMUSCULAR REEDUCATION: CPT | Performed by: PHYSICAL THERAPIST

## 2018-08-28 PROCEDURE — 97110 THERAPEUTIC EXERCISES: CPT | Performed by: PHYSICAL THERAPIST

## 2018-08-28 NOTE — PROGRESS NOTES
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 States today is a \"good pain day\". OBJECTIVE    35 min Therapeutic Exercise:  [] See flow sheet :   Rationale: increase ROM and increase strength to improve the patients ability to increase their functional activity level. 15 min Neuromuscular Re-education:  []  See flow sheet :   Rationale: increase strength, improve coordination and increase proprioception  to improve the patients ability to increase their ADLs. With   [] TE   [] TA   [] neuro   [] other: Patient Education: [x] Review HEP    [] Progressed/Changed HEP based on:   [] positioning   [] body mechanics   [] transfers   [] heat/ice application    [] other:      Other Objective/Functional Measures: Gait is without deviation. Able to perform her entire program without difficulty today. Pain Level (0-10 scale) post treatment: Knee 3/10, shoulder 2-3/10    ASSESSMENT/Changes in Function: Patient continues with pain and  joint stabilty.     Patient will continue to benefit from skilled PT services to modify and progress therapeutic interventions, address functional mobility deficits, address ROM deficits, address strength deficits and analyze and address soft tissue restrictions to attain remaining goals. [x]  See Plan of Care  []  See progress note/recertification  []  See Discharge Summary         Progress towards goals / Updated goals:  Short Term Goals: To be accomplished in 3 weeks:  1. Patient will subjectively report full compliance with prescribed HEP. AT PN:  initiated HEP. Does not report full compliance, 8/9/18  2. Patient will demonstrate left/right hip ER MMT >/=4+/5 to improve ease with ambulation on uneven ground. At PN: Progressing left hip ER= 4/5, right hip ER= 4/5, 8/9/18  3. Patient will demonstrate left/right shoulder flexion MMT 5/5 to improve ease with overhead lifting. AT PN: Progressing: B shoulder flexion 4+/5, 8/9/18      Long Term Goals: To be accomplished in 6 weeks:  1. Patient will demonstrate a significant functional improvement as demonstrated by a score of >/= 71 on shoulder FOTO and 62 on knee FOTO. At PN: Regressed shoulder FOTO = 55; knee FOTO = 43. 8/6/18  2. Patient will demonstrate left/right rhomboid, lower trapezius and middle trapezius MMT >/=4/5 to improve ease with functional lifting. AT PN:  Middle trap: left 3/5,3/5, lower trap: right and left 3+/5, rhomboids left and right= 4-/5; 8/9/18  Current: remains:  left 3/5,3/5, lower trap: right and left 3+/5, rhomboids left and right= 4-/5; 8/13/18  3. Patient will demonstrate ability to perform prone plank x45 seconds with maintenance of correct form to improve ease with yoga routine. AT PN:unable to perform, 8/9/18  Current: Progressing: Pt performs modified prone plank on forearms and knees for 20 sec with minimal cues for form.  8/20/18    PLAN  [x]  Upgrade activities as tolerated     [x]  Continue plan of care  []  Update interventions per flow sheet       []  Discharge due to:_  []  Other:_      Maria Alejandra Degroot, PT 8/28/2018  5:18 PM    Future Appointments  Date Time Provider Tena Dickerson   8/30/2018 3:30 PM Max Subramanian, [place] [facility]   9/5/2018 4:00 PM Rebecca Johnson, PT [facility]   9/10/2018 4:00 PM [facility]   9/13/2018 4:30 PM Rebecca Johnson PT [facility]   9/18/2018 4:00 PM Ntaividad Herrera [facility]   9/20/2018 4:00 PM Thomas Mane PT [facility]   9/24/2018 4:00 PM Max [facility]   9/27/2018 4:00 PM Max Subramanian, [place] [facility]   12/5/2018 3:45 PM Franchesca Mckenna MD [facility] [address]

## 2018-08-30 ENCOUNTER — HOSPITAL ENCOUNTER (OUTPATIENT)
Dept: PHYSICAL THERAPY | Age: 28
Discharge: HOME | End: 2018-08-30
Payer: COMMERCIAL

## 2018-08-30 PROCEDURE — 97112 NEUROMUSCULAR REEDUCATION: CPT

## 2018-08-30 PROCEDURE — 97110 THERAPEUTIC EXERCISES: CPT

## 2018-08-30 NOTE — PROGRESS NOTES
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       OBJECTIVE        Min Type Additional Details    [] Estim:  []Unatt       []IFC  []Premod                        []Other:  []w/ice   []w/heat  Position:  Location:    [] Estim: []Att    []TENS instruct  []NMES                    []Other:  []w/US   []w/ice   []w/heat  Position:  Location:    []  Traction: [] Cervical       []Lumbar                       [] Prone          []Supine                       []Intermittent   []Continuous Lbs:  [] before manual  [] after manual    []  Ultrasound: []Continuous   [] Pulsed                           []1MHz   []3MHz W/cm2:  Location:    []  Iontophoresis with dexamethasone         Location: [] Take home patch   [] In clinic    []  Ice     []  heat  []  Ice massage  []  Laser   []  Anodyne Position:  Location:    []  Laser with stim  []  Other:  Position:  Location:    []  Vasopneumatic Device Pressure:       [] lo [] med [] hi   Temperature: [] lo [] med [] hi   [] Skin assessment post-treatment:  []intact []redness- no adverse reaction    []redness - adverse reaction:     28 min Therapeutic Exercise:  [x] See flow sheet :   Rationale: increase ROM and increase strength to improve the patients ability to increase tolerance to activities.     25 min Neuromuscular Re-education:  [x]  See flow sheet :quad activation and scapular stabilization. Rationale: increase ROM and increase strength  to improve the patients ability to increase ease with ADLs.           With   [] TE   [] TA   [] neuro   [] other: Patient Education: [x] Review HEP    [] Progressed/Changed HEP based on:   [] positioning   [] body mechanics   [] transfers   [] heat/ice application    [] other:      Other Objective/Functional Measures:shoulder FOTO = 52, knee FOTO = 41     Pain Level (0-10 scale) post treatment: 1    ASSESSMENT/Changes in Function: Progressing pt to increase quad strength without hyperextension of the knee. Patient will continue to benefit from skilled PT services to modify and progress therapeutic interventions, address functional mobility deficits, address ROM deficits, address strength deficits and analyze and address soft tissue restrictions to attain remaining goals. []  See Plan of Care  []  See progress note/recertification  []  See Discharge Summary         Progress towards goals / Updated goals:  Short Term Goals: To be accomplished in 3 weeks:  1. Patient will subjectively report full compliance with prescribed HEP. AT PN:  initiated HEP. Does not report full compliance, 8/9/18  2. Patient will demonstrate left/right hip ER MMT >/=4+/5 to improve ease with ambulation on uneven ground. At PN: Progressing left hip ER= 4/5, right hip ER= 4/5, 8/9/18  3. Patient will demonstrate left/right shoulder flexion MMT 5/5 to improve ease with overhead lifting. AT PN: Progressing: B shoulder flexion 4+/5, 8/9/18      Long Term Goals: To be accomplished in 6 weeks:  1. Patient will demonstrate a significant functional improvement as demonstrated by a score of >/= 71 on shoulder FOTO and 62 on knee FOTO. At PN: Regressed shoulder FOTO = 55; knee FOTO = 43. 8/6/18  Current: Regressed: shoulder FOTO = 52, knee FOTO = 41. 8/30/18  2. Patient will demonstrate left/right rhomboid, lower trapezius and middle trapezius MMT >/=4/5 to improve ease with functional lifting. AT PN:  Middle trap: left 3/5,3/5, lower trap: right and left 3+/5, rhomboids left and right= 4-/5; 8/9/18  Current: remains:  left 3/5,3/5, lower trap: right and left 3+/5, rhomboids left and right= 4-/5; 8/13/18  3. Patient will demonstrate ability to perform prone plank x45 seconds with maintenance of correct form to improve ease with yoga routine. AT PN:unable to perform, 8/9/18  Current: Progressing: Pt performs modified prone plank on forearms and knees for 20 sec with minimal cues for form.  8/20/18    PLAN  []  Upgrade activities as tolerated     [x]  Continue plan of care  []  Update interventions per flow sheet       []  Discharge due to:_  []  Other:_      Sakshi Multani PTA 8/30/2018  3:28 PM    Future Appointments  Date Time Provider Tena Dickerson   8/30/2018 3:30 PM Sakshi Multani PTA [facility]   9/5/2018 4:00 PM Eze Fraga, PT [facility]   9/10/2018 4:00 PM [facility]   9/13/2018 4:30 PM Eze Fraga PT [facility]   9/18/2018 4:00 PM Mary Ellen Maldonado [facility]   9/20/2018 4:00 PM Juan Carlos Avery PT [facility]   9/24/2018 4:00 PM Sakshi Multani PTA [facility]   9/27/2018 4:00 PM Sakshi Multani PTA [facility]   12/5/2018 3:45 PM Ibeth Rosario MD [facility] [address]

## 2018-09-05 ENCOUNTER — HOSPITAL ENCOUNTER (OUTPATIENT)
Dept: PHYSICAL THERAPY | Age: 28
Discharge: HOME | End: 2018-09-05
Payer: COMMERCIAL

## 2018-09-05 PROCEDURE — 97110 THERAPEUTIC EXERCISES: CPT

## 2018-09-05 PROCEDURE — 97112 NEUROMUSCULAR REEDUCATION: CPT
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
## 2018-09-05 NOTE — PROGRESS NOTES
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     OBJECTIVE    20 min Therapeutic Exercise:  [x] See flow sheet : Emphasis placed on improving available shoulder strength and LE strength   Rationale: increase ROM and increase strength to improve the patients ability to improve ease with household ADLs    33 min Neuromuscular Re-education:  [x]  See flow sheet : Emphasis placed on improving activation and recruitment of the scapulothoracic and LE proprioceptive and kinesthetic awareness   Rationale: increase ROM, increase strength and increase proprioception  to improve the patients ability to improve ease with household overhead ADLs          With   [] TE   [] TA   [] neuro   [] other: Patient Education: [x] Review HEP    [] Progressed/Changed HEP based on:   [] positioning   [] body mechanics   [] transfers   [] heat/ice application    [] other:      Pain Level (0-10 scale) post treatment: Knees 1/10, Shoulder 1/10    ASSESSMENT/Changes in Function: Since SoC patient has demonstrated a significant improvement in scapulothoracic and LE proprioceptive and kinesthetic awareness with primarily emphasis of within clinic having been placed on promoting improved body awareness and joint position sense in order to reduce hyperextensor moment and reduce pain provocation. Patient continues to demonstrate poor activation and recruitment of trunk stabilizers. With continuation of physical therapy services to emphasize continued patient education with respect to body and joint awareness to allow for a successful discharge with continuation of independent exercise regime to promote continued strength improvements. Patient will continue to benefit from skilled PT services to modify and progress therapeutic interventions, address functional mobility deficits, address ROM deficits, address strength deficits, analyze and address soft tissue restrictions, analyze and cue movement patterns, analyze and modify body mechanics/ergonomics and assess and modify postural abnormalities to attain remaining goals. []  See Plan of Care  [x]  See progress note/recertification  []  See Discharge Summary         Progress towards goals / Updated goals:    Short Term Goals: To be accomplished in 3 weeks:  1. Patient will subjectively report full compliance with prescribed HEP. AT PN: initiated HEP. Does not report full compliance, 8/9/18  2. Patient will demonstrate left/right hip ER MMT >/=4+/5 to improve ease with ambulation on uneven ground. At PN: Progressing left hip ER= 4/5, right hip ER= 4/5, 8/9/18  Current: Met, Left Hip ER 5/5, Right Hip ER 5/5, 9/5/2018  3. Patient will demonstrate left/right shoulder flexion MMT 5/5 to improve ease with overhead lifting. AT PN: Progressing: B shoulder flexion 4+/5, 8/9/18  Current: Remains, Left Shoulder Flexion 4+/5, Right Shoulder Flexion 4+/5, 9/5/2018      Long Term Goals: To be accomplished in 6 weeks:  1. Patient will demonstrate a significant functional improvement as demonstrated by a score of >/= 71 on shoulder FOTO and 62 on knee FOTO.   At PN: Regressed shoulder FOTO = 55; knee FOTO = 43. 8/6/18  Current: Regressed: shoulder FOTO = 52, knee FOTO = 41. 8/30/18  2. Patient will demonstrate left/right rhomboid, lower trapezius and middle trapezius MMT >/=4/5 to improve ease with functional lifting. AT PN:  Middle trap: left 3/5,3/5, lower trap: right and left 3+/5, rhomboids left and right= 4-/5; 8/9/18  Current: Remains:  left 3/5,3/5, lower trap: right and left 3+/5, rhomboids left and right= 4-/5; 8/13/18  3. Patient will demonstrate ability to perform prone plank x45 seconds with maintenance of correct form to improve ease with yoga routine. AT PN:unable to perform, 8/9/18  Current: Progressing: Pt performs modified prone plank on forearms and knees for 20 sec with minimal cues for form.  8/20/18    PLAN  [x]  Upgrade activities as tolerated     [x]  Continue plan of care  []  Update interventions per flow sheet       []  Discharge due to:_  []  Other:_      Vaishali Landry PT 9/5/2018  11:54 AM    Future Appointments  Date Time Provider Tena Munozi   9/5/2018 4:00 PM Vaishali Landry PT [facility] [address]   9/10/2018 4:00 PM [address] PT [facility] 1 [facility] [address]   9/13/2018 4:30 PM Vaishali Landry PT [facility] [address]   9/18/2018 4:00 PM Fely Marie [facility] [address]   9/20/2018 4:00 PM Indira Montemayor PT [facility] [address]   9/24/2018 4:00 PM Lang Nickel, PTA [facility] [address]   9/27/2018 4:00 PM Lang Nickel, PTA [facility] [address]   12/5/2018 3:45 PM Deshawn Byrne MD [facility] [address]

## 2018-09-10 ENCOUNTER — HOSPITAL ENCOUNTER (OUTPATIENT)
Dept: PHYSICAL THERAPY | Age: 28
Discharge: HOME | End: 2018-09-10
Payer: COMMERCIAL

## 2018-09-10 PROCEDURE — 97110 THERAPEUTIC EXERCISES: CPT

## 2018-09-10 NOTE — PROGRESS NOTES
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 She states she keeps forgetting. OBJECTIVE      24 min Therapeutic Exercise:  [x] See flow sheet :   Rationale: increase ROM and increase strength to improve the patients ability to perform ADL's    With   [x] TE   [] TA   [] neuro   [] other: Patient Education: [x] Review HEP    [] Progressed/Changed HEP based on:   [] positioning   [] body mechanics   [] transfers   [] heat/ice application    [] other:      Other Objective/Functional Measures: Pt perform all TE's with adequate strength and no pain. Pain Level (0-10 scale) post treatment: 2/10    ASSESSMENT/Changes in Function: Pt continued TE's requiring verbal and tactile cues. Pt did not have pain during TE. Patient will continue to benefit from skilled PT services to modify and progress therapeutic interventions, address functional mobility deficits, address ROM deficits, address strength deficits, analyze and address soft tissue restrictions and analyze and cue movement patterns to attain remaining goals.      [x]  See Plan of Care  []  See progress note/recertification  []  See Discharge Summary         Progress towards goals / Updated goals:  Short Term Goals: To be accomplished in 3 weeks:  1. Patient will subjectively report full compliance with prescribed HEP. AT PN: initiated HEP. Does not report full compliance, 8/9/18  2. Patient will demonstrate left/right hip ER MMT >/=4+/5 to improve ease with ambulation on uneven ground. At PN: Progressing left hip ER= 4/5, right hip ER= 4/5, 8/9/18  Current: Met, Left Hip ER 5/5, Right Hip ER 5/5, 9/5/2018  3. Patient will demonstrate left/right shoulder flexion MMT 5/5 to improve ease with overhead lifting. AT PN: Progressing: B shoulder flexion 4+/5, 8/9/18  Current: Remains, Left Shoulder Flexion 4+/5, Right Shoulder Flexion 4+/5, 9/5/2018      Long Term Goals: To be accomplished in 6 weeks:  1. Patient will demonstrate a significant functional improvement as demonstrated by a score of >/= 71 on shoulder FOTO and 62 on knee FOTO. At PN: Regressed shoulder FOTO = 55; knee FOTO = 43. 8/6/18  Current: Regressed: shoulder FOTO = 52, knee FOTO = 41. 8/30/18  2. Patient will demonstrate left/right rhomboid, lower trapezius and middle trapezius MMT >/=4/5 to improve ease with functional lifting. AT PN:  Middle trap: left 3/5,3/5, lower trap: right and left 3+/5, rhomboids left and right= 4-/5; 8/9/18  Current: Remains:  left 3/5,3/5, lower trap: right and left 3+/5, rhomboids left and right= 4-/5; 8/13/18  3. Patient will demonstrate ability to perform prone plank x45 seconds with maintenance of correct form to improve ease with yoga routine. AT PN:unable to perform, 8/9/18  Current: Progressing: Pt performs modified prone plank on forearms and knees for 20 sec with minimal cues for form.  8/20/18    PLAN  [x]  Upgrade activities as tolerated     [x]  Continue plan of care  [x]  Update interventions per flow sheet       []  Discharge due to:_  []  Other:_      Kathy Dunn, MARGARITA 9/10/2018  4:21 PM    Future Appointments  Date Time Provider Tena Dickerson   9/13/2018 4:30 PM Hernán Ledezma, PT [facility]   9/18/2018 4:00 PM Aida Grecia [facility]   9/20/2018 4:00 PM Kasia Rogers, PT [facility]   9/24/2018 4:00 PM Maria Eugenia Obando [facility]   9/27/2018 4:00 PM Omaira Pimentel [facility]   12/19/2018 3:45 PM Liz Jacques MD MD Miek Ryan

## 2018-09-13 ENCOUNTER — HOSPITAL ENCOUNTER (OUTPATIENT)
Dept: PHYSICAL THERAPY | Age: 28
End: 2018-09-13
Payer: COMMERCIAL

## 2018-09-18 ENCOUNTER — HOSPITAL ENCOUNTER (OUTPATIENT)
Dept: PHYSICAL THERAPY | Age: 28
Discharge: HOME | End: 2018-09-18
Payer: COMMERCIAL

## 2018-09-18 PROCEDURE — 97110 THERAPEUTIC EXERCISES: CPT

## 2018-09-18 PROCEDURE — 97112 NEUROMUSCULAR REEDUCATION: CPT

## 2018-09-20 ENCOUNTER — HOSPITAL ENCOUNTER (OUTPATIENT)
Dept: PHYSICAL THERAPY | Age: 28
Discharge: HOME | End: 2018-09-20
Payer: COMMERCIAL

## 2018-09-20 PROCEDURE — 97112 NEUROMUSCULAR REEDUCATION: CPT

## 2018-09-20 PROCEDURE — 97110 THERAPEUTIC EXERCISES: CPT

## 2018-09-20 NOTE — PROGRESS NOTES
PT DAILY TREATMENT NOTE - [facility]     Patient Name: Beverley Grant  Date:2018  : 1990  [x]  Patient  Verified  Payor: BLUE CROSS / Plan: [employer] 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 Pt reports that she is trying to figure out what causes the pain. OBJECTIVE    35 min Therapeutic Exercise:  [x] See flow sheet :   Rationale: increase ROM, increase strength and improve coordination to improve the patients ability to increase ease with functional tasks     25 min Neuromuscular Re-education:  [x]  See flow sheet :   Rationale: increase strength, improve coordination, improve balance and increase proprioception  to improve the patients ability to increase ease with return to recreational activities           With   [] TE   [] TA   [] neuro   [] other: Patient Education: [x] Review HEP    [] Progressed/Changed HEP based on:   [] positioning   [] body mechanics   [] transfers   [] heat/ice application    [] other:      Other Objective/Functional Measures: Needed rest breaks at times secondary to pain and fatigue with exercises today. Fatigue noted with modified forward planks today but able to hold for 30 seconds.     Pain Level (0-10 scale) post treatment: 3    ASSESSMENT/Changes in Function: Challenged with lateral planks and unable to hold for 30 seconds. Pt continues to have limitations in strength, endurance, and stability in the core and scapulae. Challenged with stability with inverted BOSU chop and lift exercise and needed UE support at times to maintain balance. Continue POC as tolerated. Patient will continue to benefit from skilled PT services to modify and progress therapeutic interventions, address functional mobility deficits, address ROM deficits, address strength deficits, analyze and address soft tissue restrictions, analyze and cue movement patterns, analyze and modify body mechanics/ergonomics, assess and modify postural abnormalities and instruct in home and community integration to attain remaining goals. []  See Plan of Care  []  See progress note/recertification  []  See Discharge Summary         Progress towards goals / Updated goals:  Short Term Goals: To be accomplished in 3 weeks:  1. Patient will subjectively report full compliance with prescribed HEP. AT PN: initiated HEP. Does not report full compliance, 8/9/18  2. Patient will demonstrate left/right hip ER MMT >/=4+/5 to improve ease with ambulation on uneven ground. At PN: Progressing left hip ER= 4/5, right hip ER= 4/5, 8/9/18  Current: Met, Left Hip ER 5/5, Right Hip ER 5/5, 9/5/2018  3. Patient will demonstrate left/right shoulder flexion MMT 5/5 to improve ease with overhead lifting. AT PN: Progressing: B shoulder flexion 4+/5, 8/9/18  Current: Remains, Left Shoulder Flexion 4+/5, Right Shoulder Flexion 4+/5, 9/5/2018      Long Term Goals: To be accomplished in 6 weeks:  1. Patient will demonstrate a significant functional improvement as demonstrated by a score of >/= 71 on shoulder FOTO and 62 on knee FOTO. At PN: Regressed shoulder FOTO = 55; knee FOTO = 43. 8/6/18  Current: Regressed: shoulder FOTO = 52, knee FOTO = 41. 8/30/18  2.  Patient will demonstrate left/right rhomboid, lower trapezius and middle trapezius MMT >/=4/5 to improve ease with functional lifting. AT PN:  Middle trap: left 3/5,3/5, lower trap: right and left 3+/5, rhomboids left and right= 4-/5; 8/9/18  Current: Remains:  left 3/5,3/5, lower trap: right and left 3+/5, rhomboids left and right= 4-/5; 8/13/18  3. Patient will demonstrate ability to perform prone plank x45 seconds with maintenance of correct form to improve ease with yoga routine.   AT PN:unable to perform, 8/9/18  Current: MET able to hold modified plank for 30 seconds without cues for form 9/18/18    PLAN  [x]  Upgrade activities as tolerated     [x]  Continue plan of care  [x]  Update interventions per flow sheet       []  Discharge due to:_  []  Other:_      Pretty Diana PT 9/20/2018  4:47 PM    Future Appointments  Date Time Provider Tena Dickerson   9/20/2018 4:00 PM Pretty Diana PT [facility]   9/24/2018 4:00 PM Reagan Soto PTA [facility]   9/27/2018 4:00 PM Omaira Son [facility]   12/19/2018 3:45 PM Veda Russ MD [facility] [address]

## 2018-09-24 ENCOUNTER — HOSPITAL ENCOUNTER (OUTPATIENT)
Dept: PHYSICAL THERAPY | Age: 28
Discharge: HOME | End: 2018-09-24
Payer: COMMERCIAL

## 2018-09-24 PROCEDURE — 97110 THERAPEUTIC EXERCISES: CPT

## 2018-09-24 PROCEDURE — 97112 NEUROMUSCULAR REEDUCATION: CPT

## 2018-09-24 NOTE — PROGRESS NOTES
PT DAILY TREATMENT NOTE - [facility]     Patient Name: Glenys Ogden  Date:2018  : 1990  [x]  Patient  Verified  Payor: BLUE CROSS / Plan: [employer] 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 Notes her knees are really bothering her today. Pt states she is not performing her HEP as consistently as she should . OBJECTIVE      30 min Therapeutic Exercise:  [x] See flow sheet :   Rationale: increase ROM, increase strength and improve coordination to improve the patients ability to increase ease with functional tasks     28 min Neuromuscular Re-education:  [x]  See flow sheet :quad activation and scapular stability exercises, dynamic balance exercises    Rationale: increase strength, improve coordination, improve balance and increase proprioception  to improve the patients ability to increase ease community ADLs          With   [] TE   [] TA   [] neuro   [] other: Patient Education: [x] Review HEP    [] Progressed/Changed HEP based on:   [] positioning   [] body mechanics   [] transfers   [] heat/ice application    [] other:          Pain Level (0-10 scale) post treatment: shoulder=3, knee=4    ASSESSMENT/Changes in Function: Held on chop/lifts on bosu secondary to shoulder pain. Pt continues to be challenged with lateral modified planks and exercises on bosu ball.  Educated pt on importance to compliance with HEP to see long term progression. Talked to pt about possible D/C next week. Patient will continue to benefit from skilled PT services to modify and progress therapeutic interventions, address functional mobility deficits, address ROM deficits, address strength deficits, analyze and address soft tissue restrictions, analyze and cue movement patterns, analyze and modify body mechanics/ergonomics and instruct in home and community integration to attain remaining goals. [x]  See Plan of Care  []  See progress note/recertification  []  See Discharge Summary         Progress towards goals / Updated goals:  Short Term Goals: To be accomplished in 3 weeks:  1. Patient will subjectively report full compliance with prescribed HEP. AT PN: initiated HEP. Does not report full compliance, 8/9/18  Current: Remains,does not report full compliance with HEP, 9/24/18  2. Patient will demonstrate left/right hip ER MMT >/=4+/5 to improve ease with ambulation on uneven ground. At PN: Progressing left hip ER= 4/5, right hip ER= 4/5, 8/9/18  Current: Met, Left Hip ER 5/5, Right Hip ER 5/5, 9/5/2018  3. Patient will demonstrate left/right shoulder flexion MMT 5/5 to improve ease with overhead lifting. AT PN: Progressing: B shoulder flexion 4+/5, 8/9/18  Current: Remains, Left Shoulder Flexion 4+/5, Right Shoulder Flexion 4+/5, 9/5/2018      Long Term Goals: To be accomplished in 6 weeks:  1. Patient will demonstrate a significant functional improvement as demonstrated by a score of >/= 71 on shoulder FOTO and 62 on knee FOTO. At PN: Regressed shoulder FOTO = 55; knee FOTO = 43. 8/6/18  Current: Regressed: shoulder FOTO = 52, knee FOTO = 41. 8/30/18  2. Patient will demonstrate left/right rhomboid, lower trapezius and middle trapezius MMT >/=4/5 to improve ease with functional lifting.   AT PN:  Middle trap: left 3/5,3/5, lower trap: right and left 3+/5, rhomboids left and right= 4-/5; 8/9/18  Current: Remains:  left 3/5,3/5, lower trap: right and left 3+/5, rhomboids left and right= 4-/5; 8/13/18  3. Patient will demonstrate ability to perform prone plank x45 seconds with maintenance of correct form to improve ease with yoga routine.   AT PN:unable to perform, 8/9/18  Current: MET able to hold modified plank for 30 seconds without cues for form 9/18/18    PLAN  [x]  Upgrade activities as tolerated     [x]  Continue plan of care  []  Update interventions per flow sheet       []  Discharge due to:_  []  Other:_      Mary lElen Maldonado 9/24/2018  3:53 PM    Future Appointments  Date Time Provider Tena Jayla   9/24/2018 4:00 PM Mary Ellen Maldonado [facility]   9/27/2018 4:00 PM Sakshi Multani PTA [facility]   10/1/2018 4:30 PM Sakshi Multani PTA [facility]   10/4/2018 4:00 PM Omaira Townsend [facility]   12/19/2018 3:45 PM Ibeth Rosario MD MD Marinda Gottron

## 2018-09-27 ENCOUNTER — APPOINTMENT (OUTPATIENT)
Dept: PHYSICAL THERAPY | Age: 28
End: 2018-09-27
Payer: COMMERCIAL

## 2018-10-01 ENCOUNTER — HOSPITAL ENCOUNTER (OUTPATIENT)
Dept: PHYSICAL THERAPY | Age: 28
Discharge: HOME | End: 2018-10-01
Payer: COMMERCIAL

## 2018-10-01 PROCEDURE — 97112 NEUROMUSCULAR REEDUCATION: CPT

## 2018-10-01 PROCEDURE — 97110 THERAPEUTIC EXERCISES: CPT

## 2018-10-01 NOTE — PROGRESS NOTES
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      OBJECTIVE        Min Type Additional Details    [] Estim:  []Unatt       []IFC  []Premod                        []Other:  []w/ice   []w/heat  Position:  Location:    [] Estim: []Att    []TENS instruct  []NMES                    []Other:  []w/US   []w/ice   []w/heat  Position:  Location:    []  Traction: [] Cervical       []Lumbar                       [] Prone          []Supine                       []Intermittent   []Continuous Lbs:  [] before manual  [] after manual    []  Ultrasound: []Continuous   [] Pulsed                           []1MHz   []3MHz W/cm2:  Location:    []  Iontophoresis with dexamethasone         Location: [] Take home patch   [] In clinic    []  Ice     []  heat  []  Ice massage  []  Laser   []  Anodyne Position:  Location:    []  Laser with stim  []  Other:  Position:  Location:    []  Vasopneumatic Device Pressure:       [] lo [] med [] hi   Temperature: [] lo [] med [] hi   [] Skin assessment post-treatment:  []intact []redness- no adverse reaction    []redness - adverse reaction:        28 min Therapeutic Exercise:  [x] See flow sheet :   Rationale: increase ROM and increase strength to improve the patients ability to increase tolerance to activities.     25 min Neuromuscular Re-education:  [x]  See flow sheet :quad activation and scapular stabilization. Rationale: increase ROM and increase strength  to improve the patients ability to increase ease with ADLs.               With   [] TE   [] TA   [] neuro   [] other: Patient Education: [x] Review HEP    [] Progressed/Changed HEP based on:   [] positioning   [] body mechanics   [] transfers   [] heat/ice application    [] other:      Other Objective/Functional Measures:      Pain Level (0-10 scale) post treatment: 2    ASSESSMENT/Changes in Function: Provided pt with updated HEP. Pt plans to DC [place]. Patient will continue to benefit from skilled PT services to modify and progress therapeutic interventions, address functional mobility deficits, address ROM deficits, address strength deficits and analyze and address soft tissue restrictions to attain remaining goals. []  See Plan of Care  []  See progress note/recertification  []  See Discharge Summary         Progress towards goals / Updated goals:  Short Term Goals: To be accomplished in 3 weeks:  1. Patient will subjectively report full compliance with prescribed HEP. AT PN: initiated HEP. Does not report full compliance, 8/9/18  Current: Remains,does not report full compliance with HEP, 9/24/18  2. Patient will demonstrate left/right hip ER MMT >/=4+/5 to improve ease with ambulation on uneven ground. At PN: Progressing left hip ER= 4/5, right hip ER= 4/5, 8/9/18  Current: Met, Left Hip ER 5/5, Right Hip ER 5/5, 9/5/2018  3. Patient will demonstrate left/right shoulder flexion MMT 5/5 to improve ease with overhead lifting. AT PN: Progressing: B shoulder flexion 4+/5, 8/9/18  Current: Remains, Left Shoulder Flexion 4+/5, Right Shoulder Flexion 4+/5, 9/5/2018      Long Term Goals: To be accomplished in 6 weeks:  1.  Patient will demonstrate a significant functional improvement as demonstrated by a score of >/= 71 on shoulder FOTO and 62 on knee FOTO. At PN: Regressed shoulder FOTO = 55; knee FOTO = 43. 8/6/18  Current: Regressed: shoulder FOTO = 52, knee FOTO = 41. 8/30/18  2. Patient will demonstrate left/right rhomboid, lower trapezius and middle trapezius MMT >/=4/5 to improve ease with functional lifting. AT PN:  Middle trap: left 3/5,3/5, lower trap: right and left 3+/5, rhomboids left and right= 4-/5; 8/9/18  Current: Remains:  left 3/5,3/5, lower trap: right and left 3+/5, rhomboids left and right= 4-/5; 8/13/18  3. Patient will demonstrate ability to perform prone plank x45 seconds with maintenance of correct form to improve ease with yoga routine.   AT PN:unable to perform, 8/9/18  Current: MET able to hold modified plank for 30 seconds without cues for form 9/18/18       PLAN  []  Upgrade activities as tolerated     [x]  Continue plan of care  []  Update interventions per flow sheet       []  Discharge due to:_  []  Other:_      Nory Jo PTA 10/1/2018  5:24 PM    Future Appointments  Date Time Provider Tena Dickerson   10/4/2018 4:00 PM Omaira Jules [facility]   12/19/2018 3:45 PM Laurie Birmingham MD MD Tyron Decker

## 2018-10-04 ENCOUNTER — HOSPITAL ENCOUNTER (OUTPATIENT)
Dept: PHYSICAL THERAPY | Age: 28
Discharge: HOME | End: 2018-10-04
Payer: COMMERCIAL

## 2018-10-04 PROCEDURE — 97110 THERAPEUTIC EXERCISES: CPT

## 2018-10-04 PROCEDURE — 97112 NEUROMUSCULAR REEDUCATION: CPT

## 2018-10-04 NOTE — PROGRESS NOTES
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     OBJECTIVE          Min Type Additional Details    [] Estim:  []Unatt       []IFC  []Premod                        []Other:  []w/ice   []w/heat  Position:  Location:    [] Estim: []Att    []TENS instruct  []NMES                    []Other:  []w/US   []w/ice   []w/heat  Position:  Location:    []  Traction: [] Cervical       []Lumbar                       [] Prone          []Supine                       []Intermittent   []Continuous Lbs:  [] before manual  [] after manual    []  Ultrasound: []Continuous   [] Pulsed                           []1MHz   []3MHz W/cm2:  Location:    []  Iontophoresis with dexamethasone         Location: [] Take home patch   [] In clinic    []  Ice     []  heat  []  Ice massage  []  Laser   []  Anodyne Position:  Location:    []  Laser with stim  []  Other:  Position:  Location:    []  Vasopneumatic Device Pressure:       [] lo [] med [] hi   Temperature: [] lo [] med [] hi   [] Skin assessment post-treatment:  []intact []redness- no adverse reaction    []redness - adverse reaction:           26 min Therapeutic Exercise:  [x] See flow sheet :   Rationale: increase ROM and increase strength to improve the patients ability to increase tolerance to activities.     25 min Neuromuscular Re-education:  [x]  See flow sheet :quad activation and scapular stabilization. Rationale: increase ROM and increase strength  to improve the patients ability to increase ease with ADLs.              With   [] TE   [] TA   [] neuro   [] other: Patient Education: [x] Review HEP    [] Progressed/Changed HEP based on:   [] positioning   [] body mechanics   [] transfers   [] heat/ice application    [] other:      Other Objective/Functional Measures: see goals      Pain Level (0-10 scale) post treatment: knee 2; shoulder 3     ASSESSMENT/Changes in Function: Pt has progressed well toward LTGs. Pt has increase strength in LE and shoulders to help stabilize. Pt has returned to her normal Yoga classes with being cautious of not hyperextending her joints when performing poses. Patient will continue to benefit from skilled PT services to modify and progress therapeutic interventions, address functional mobility deficits, address ROM deficits, address strength deficits and analyze and address soft tissue restrictions to attain remaining goals. []  See Plan of Care  [x]  See progress note/recertification  []  See Discharge Summary         Progress towards goals / Updated goals:  Short Term Goals: To be accomplished in 3 weeks:  1. Patient will subjectively report full compliance with prescribed HEP. AT PN: initiated HEP. Does not report full compliance, 8/9/18  Current: Remains,does not report full compliance with HEP, 10/4/18  2. Patient will demonstrate left/right hip ER MMT >/=4+/5 to improve ease with ambulation on uneven ground. At PN: Progressing left hip ER= 4/5, right hip ER= 4/5, 8/9/18  Current: Met, Left Hip ER 5/5, Right Hip ER 5/5, 9/5/2018  3. Patient will demonstrate left/right shoulder flexion MMT 5/5 to improve ease with overhead lifting.   AT PN: Progressing: B shoulder flexion 4+/5, 8/9/18  Current: Remains, Left Shoulder Flexion 4+/5, Right Shoulder Flexion 5/5, 10/4/2018      Long Term Goals: To be accomplished in 6 weeks:  1. Patient will demonstrate a significant functional improvement as demonstrated by a score of >/= 71 on shoulder FOTO and 62 on knee FOTO. At PN: Regressed shoulder FOTO = 55; knee FOTO = 43. 8/6/18  Current: Regressed: shoulder FOTO = 52, knee FOTO = 41. 8/30/18  2. Patient will demonstrate left/right rhomboid, lower trapezius and middle trapezius MMT >/=4/5 to improve ease with functional lifting. AT PN:  Middle trap: left 3/5,3/5, lower trap: right and left 3+/5, rhomboids left and right= 4-/5; 8/9/18  Current: Remains:  left rhomboids 4/5, lower trap 3+/5: right rhomboids 4/5, LT right 3+/5, 10/4/18  3. Patient will demonstrate ability to perform prone plank x45 seconds with maintenance of correct form to improve ease with yoga routine.   AT PN:unable to perform, 8/9/18  Current: MET able to hold modified plank for 30 seconds without cues for form 9/18/18       PLAN  []  Upgrade activities as tolerated     []  Continue plan of care  []  Update interventions per flow sheet       [x]  Discharge due to:_Pt plans to continue strengthening at home.   []  Other:_      Keven Fox PTA 10/4/2018  3:20 PM    Future Appointments  Date Time Provider Tena Dickerson   10/4/2018 4:00 PM Omaira Ramos [facility]   12/19/2018 3:45 PM Charlotte Grayson MD [facility] Zainab George
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
## 2018-12-19 ENCOUNTER — OFFICE VISIT (OUTPATIENT)
Dept: ORTHOPEDIC SURGERY | Age: 28
End: 2018-12-19

## 2018-12-19 VITALS
HEART RATE: 80 BPM | DIASTOLIC BLOOD PRESSURE: 81 MMHG | BODY MASS INDEX: 27.94 KG/M2 | HEIGHT: 62 IN | SYSTOLIC BLOOD PRESSURE: 122 MMHG | OXYGEN SATURATION: 99 % | RESPIRATION RATE: 12 BRPM | TEMPERATURE: 99.2 F | WEIGHT: 151.8 LBS

## 2018-12-19 DIAGNOSIS — Q79.60 EHLERS-DANLOS DISEASE: Primary | ICD-10-CM

## 2018-12-19 RX ORDER — AZELASTINE HCL 205.5 UG/1
SPRAY NASAL
Refills: 3 | COMMUNITY
Start: 2018-11-12

## 2018-12-19 RX ORDER — BENZOCAINE .13; .15; .5; 2 G/100G; G/100G; G/100G; G/100G
2 GEL ORAL DAILY
COMMUNITY

## 2018-12-19 NOTE — PROGRESS NOTES
Patient: Freida Stock                MRN: [id number]       SSN: [id number]  YOB: 1990        AGE: 25 y.o. SEX: female  Body mass index is 27.76 kg/m². PCP: Audra Sandy MD  12/19/18    Chief Complaint: Bilateral shoulder pain    HISTORY OF PRESENT ILLNESS:   Freida Stock returns to the office today again for her multiple joint complaints. She has been seen by a , who thinks she may have a vascular Simon-Danlos. She is due for genetic testing next month. Past Medical History:   Diagnosis Date    Asthma     Chronic headaches        History reviewed. No pertinent family history. Current Outpatient Medications   Medication Sig Dispense Refill    azelastine (ASTEPRO) 0.15 % (205.5 mcg) INSTILL 1 SPRAY IN EACH  NOSTIL BID  3    budesonide (RHINOCORT AQUA) 32 mcg/actuation nasal spray 2 Sprays by Both Nostrils route daily.  cetirizine (ZYRTEC) 10 mg tablet Take  by mouth.  montelukast (SINGULAIR) 10 mg tablet Take 10 mg by mouth daily.  L-norgest/e.estradiol-e.estrad (ASHLYNA PO) Take  by mouth.  levalbuterol tartrate (XOPENEX) 45 mcg/actuation inhaler Take  by inhalation.  albuterol (PROVENTIL HFA, VENTOLIN HFA, PROAIR HFA) 90 mcg/actuation inhaler Take  by inhalation.  Lactobacillus acidophilus (PROBIOTIC PO) Take  by mouth.  ibuprofen (MOTRIN) 600 mg tablet Take  by mouth every six (6) hours as needed for Pain.  acetaminophen-caffeine 500-65 mg (EXCEDRINE TENSION HEADACHE) 500-65 mg tab Take 2 Tabs by mouth as needed for Headache.  fluticasone (FLONASE ALLERGY RELIEF) 50 mcg/actuation nasal spray 2 Sprays by Both Nostrils route daily.  loratadine (CLARITIN) 10 mg tablet Take 10 mg by mouth. Allergies   Allergen Reactions    Joshua-Dm [Chlorpheniramine-Phenyleph-Dm] Anaphylaxis, Itching, Swelling and Other (comments)    Clarithromycin Anaphylaxis       History reviewed.  No pertinent surgical history. Social History     Socioeconomic History    Marital status: UNKNOWN     Spouse name: Not on file    Number of children: Not on file    Years of education: Not on file    Highest education level: Not on file   Social Needs    Financial resource strain: Not on file    Food insecurity - worry: Not on file    Food insecurity - inability: Not on file   [employer] needs - medical: Not on file   [employer] needs - non-medical: Not on file   Occupational History    Not on file   Tobacco Use    Smoking status: Never Smoker    Smokeless tobacco: Never Used   Substance and Sexual Activity    Alcohol use: No    Drug use: Not on file    Sexual activity: Not on file   Other Topics Concern    Not on file   Social History Narrative    Not on file       REVIEW OF SYSTEMS:      No changes from previous review of systems unless noted. PHYSICAL EXAMINATION:  Visit Vitals  /81   Pulse 80   Temp 99.2 °F (37.3 °C) (Oral)   Resp 12   Ht 5' 2\" (1.575 m)   Wt 151 lb 12.8 oz (68.9 kg)   SpO2 99%   BMI 27.76 kg/m²     Body mass index is 27.76 kg/m². GENERAL: Alert and oriented x3, in no acute distress. HEENT: Normocephalic, atraumatic. RESP: Non labored breathing. SKIN: No rashes or lesions noted. PHYSICAL EXAMINATION:   Physical exam of both shoulders reveals full shoulder range of motion. No instability. She has mild apprehension on the right and the left. Mild pain with biceps stress testing bilaterally. Pain with rotator cuff testing without weakness. No anterior or posterior instability. There is some hyperlaxity noted. 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 I think this is causing her joint complaints and pain. She has tried physical therapy. She is not interested in surgery at this time. She is going to get her genetic testing and then contact me when that is done with the results.   We did discuss how surgery in the future may be an option, but I would not recommend it at this point in time and she is not interested at this point in time. We will continue to see her as needed.                 Electronically signed by: Feliciano Martel MD

## 2019-10-29 ENCOUNTER — OFFICE VISIT (OUTPATIENT)
Dept: ORTHOPEDIC SURGERY | Facility: CLINIC | Age: 29
End: 2019-10-29

## 2019-10-29 VITALS
SYSTOLIC BLOOD PRESSURE: 126 MMHG | BODY MASS INDEX: 28.34 KG/M2 | WEIGHT: 154 LBS | HEIGHT: 62 IN | HEART RATE: 59 BPM | DIASTOLIC BLOOD PRESSURE: 82 MMHG | TEMPERATURE: 98.8 F | RESPIRATION RATE: 16 BRPM | OXYGEN SATURATION: 100 %

## 2019-10-29 DIAGNOSIS — M79.645 PAIN OF LEFT THUMB: ICD-10-CM

## 2019-10-29 DIAGNOSIS — S63.642A SKIER'S THUMB, LEFT, INITIAL ENCOUNTER: Primary | ICD-10-CM

## 2019-10-29 RX ORDER — CEPHRADINE 500 MG
CAPSULE ORAL
COMMUNITY

## 2019-10-29 RX ORDER — NORETHINDRONE ACETATE AND ETHINYL ESTRADIOL 1MG-20(21)
KIT ORAL
COMMUNITY

## 2019-10-29 RX ORDER — SODIUM CHLORIDE TAB 1 GM 1 G
1 TAB MISCELLANEOUS 3 TIMES DAILY
COMMUNITY

## 2019-10-29 NOTE — PROGRESS NOTES
Patient: Kt Miller                MRN: [id number]       SSN: [id number]  YOB: 1990        AGE: 30 y.o. SEX: female  Body mass index is 28.17 kg/m². PCP: Regan Duggan MD  10/29/19    Chief Complaint: Left thumb injury    HISTORY OF PRESENT ILLNESS:  Boris Vega reports today for a new problem. She injured her left thumb about a week ago. She was lifting up a bed when she put all the pressure on her thumb and her thumb was bent backwards. Since that time, she has been having pain in the thumb and difficulty with . She has not had any imaging of it. Past Medical History:   Diagnosis Date    Asthma     Chronic headaches        History reviewed. No pertinent family history. Current Outpatient Medications   Medication Sig Dispense Refill    erenumab-aooe (AIMOVIG AUTOINJECTOR) 140 mg/mL injection 140 mg by SubCUTAneous route every thirty (30) days.  norethindrone-ethinyl estradiol (JUNEL FE 1/20) 1 mg-20 mcg (21)/75 mg (7) tab Take  by mouth.  levalbuterol HCl (XOPENEX IN) Take  by inhalation.  sodium chloride 1 gram tablet Take 1 g by mouth two (2) times a day.  docosahexanoic acid/epa (FISH OIL PO) Take  by mouth.  cholecalciferol, vitamin d3, 10,000 unit cap Take  by mouth.  montelukast (SINGULAIR) 10 mg tablet Take 10 mg by mouth daily.  loratadine (CLARITIN) 10 mg tablet Take 10 mg by mouth.  levalbuterol tartrate (XOPENEX) 45 mcg/actuation inhaler Take  by inhalation.  Lactobacillus acidophilus (PROBIOTIC PO) Take  by mouth.  azelastine (ASTEPRO) 0.15 % (205.5 mcg) INSTILL 1 SPRAY IN EACH  NOSTIL BID  3    budesonide (RHINOCORT AQUA) 32 mcg/actuation nasal spray 2 Sprays by Both Nostrils route daily.  cetirizine (ZYRTEC) 10 mg tablet Take  by mouth.  fluticasone (FLONASE ALLERGY RELIEF) 50 mcg/actuation nasal spray 2 Sprays by Both Nostrils route daily.       L-norgest/e.estradiol-e.estrad (ASHLYNA PO) Take  by mouth.  albuterol (PROVENTIL HFA, VENTOLIN HFA, PROAIR HFA) 90 mcg/actuation inhaler Take  by inhalation.  ibuprofen (MOTRIN) 600 mg tablet Take  by mouth every six (6) hours as needed for Pain.  acetaminophen-caffeine 500-65 mg (EXCEDRINE TENSION HEADACHE) 500-65 mg tab Take 2 Tabs by mouth as needed for Headache. Allergies   Allergen Reactions    Joshua-Dm [Chlorpheniramine-Phenyleph-Dm] Anaphylaxis, Itching, Swelling and Other (comments)    Clarithromycin Anaphylaxis       History reviewed. No pertinent surgical history.     Social History     Socioeconomic History    Marital status: UNKNOWN     Spouse name: Not on file    Number of children: Not on file    Years of education: Not on file    Highest education level: Not on file   Occupational History    Not on file   Social Needs    Financial resource strain: Not on file    Food insecurity:     Worry: Not on file     Inability: Not on file    Transportation needs:     Medical: Not on file     Non-medical: Not on file   Tobacco Use    Smoking status: Never Smoker    Smokeless tobacco: Never Used   Substance and Sexual Activity    Alcohol use: No    Drug use: Not on file    Sexual activity: Not on file   Lifestyle    Physical activity:     Days per week: Not on file     Minutes per session: Not on file    Stress: Not on file   Relationships    Social connections:     Talks on phone: Not on file     Gets together: Not on file     Attends [employer] service: Not on file     Active member of club or organization: Not on file     Attends meetings of clubs or organizations: Not on file     Relationship status: Not on file    Intimate partner violence:     Fear of current or ex partner: Not on file     Emotionally abused: Not on file     Physically abused: Not on file     Forced sexual activity: Not on file   Other Topics Concern    Not on file   Social History Narrative    Not on file       REVIEW OF SYSTEMS:      No changes from previous review of systems unless noted. PHYSICAL EXAMINATION:  Visit Vitals  /82   Pulse (!) 59   Temp 98.8 °F (37.1 °C) (Oral)   Resp 16   Ht 5' 2\" (1.575 m)   Wt 154 lb (69.9 kg)   SpO2 100%   BMI 28.17 kg/m²     Body mass index is 28.17 kg/m². GENERAL: Alert and oriented x3, in no acute distress. HEENT: Normocephalic, atraumatic. RESP: Non labored breathing. SKIN: No rashes or lesions noted. PHYSICAL EXAM:  Physical exam of the left thumb with tenderness to palpation over the MCP joint. She has increased opening of the MCP joint to a radially deviated stress compared to the right side. She is otherwise neurovascularly intact. IMAGING:  X-rays of the left thumb were taken in the office today, which do not show any acute bony abnormalities. ASSESSMENT AND PLAN:   Saul Farrell is a 30year-old female with concern for a skier's thumb type injury of her left thumb ulnar collateral ligament. I have recommended an MRI of this along with a thumb spica brace. I will see her back after that is completed.               Electronically signed by: Remberto Schafer MD

## 2019-11-01 ENCOUNTER — HOSPITAL ENCOUNTER (OUTPATIENT)
Age: 29
Discharge: HOME | End: 2019-11-01
Attending: ORTHOPAEDIC SURGERY
Payer: COMMERCIAL

## 2019-11-01 DIAGNOSIS — S63.642A SKIER'S THUMB, LEFT, INITIAL ENCOUNTER: ICD-10-CM

## 2019-11-01 PROCEDURE — 73218 MRI UPPER EXTREMITY W/O DYE: CPT

## 2019-11-08 ENCOUNTER — OFFICE VISIT (OUTPATIENT)
Dept: ORTHOPEDIC SURGERY | Facility: CLINIC | Age: 29
End: 2019-11-08

## 2019-11-08 VITALS
TEMPERATURE: 98.3 F | DIASTOLIC BLOOD PRESSURE: 81 MMHG | HEART RATE: 81 BPM | BODY MASS INDEX: 27.79 KG/M2 | SYSTOLIC BLOOD PRESSURE: 132 MMHG | RESPIRATION RATE: 16 BRPM | HEIGHT: 62 IN | WEIGHT: 151 LBS

## 2019-11-08 DIAGNOSIS — S63.642D SPRAIN OF METACARPOPHALANGEAL JOINT OF LEFT THUMB, SUBSEQUENT ENCOUNTER: Primary | ICD-10-CM

## 2019-11-08 NOTE — PROGRESS NOTES
Patient: Abdoulaye Zafar                MRN: [id number]       SSN: [id number]  YOB: 1990        AGE: 30 y.o. SEX: female  Body mass index is 27.62 kg/m². PCP: Samantha White MD  11/08/19    Chief Complaint: Left thumb follow up    HISTORY OF PRESENT ILLNESS:  Juan Ramon Lovelace returns to the office today for her left thumb. Her pain is between a 4 and a 7/10 on most days. She had her MRI. She has been wearing her brace. No new injuries or trauma. Past Medical History:   Diagnosis Date    Asthma     Chronic headaches        History reviewed. No pertinent family history. Current Outpatient Medications   Medication Sig Dispense Refill    erenumab-aooe (AIMOVIG AUTOINJECTOR) 140 mg/mL injection 140 mg by SubCUTAneous route every thirty (30) days.  norethindrone-ethinyl estradiol (JUNEL FE 1/20) 1 mg-20 mcg (21)/75 mg (7) tab Take  by mouth.  levalbuterol HCl (XOPENEX IN) Take  by inhalation.  sodium chloride 1 gram tablet Take 1 g by mouth two (2) times a day.  docosahexanoic acid/epa (FISH OIL PO) Take  by mouth.  cholecalciferol, vitamin d3, 10,000 unit cap Take  by mouth.  azelastine (ASTEPRO) 0.15 % (205.5 mcg) INSTILL 1 SPRAY IN EACH  NOSTIL BID  3    budesonide (RHINOCORT AQUA) 32 mcg/actuation nasal spray 2 Sprays by Both Nostrils route daily.  cetirizine (ZYRTEC) 10 mg tablet Take  by mouth.  fluticasone (FLONASE ALLERGY RELIEF) 50 mcg/actuation nasal spray 2 Sprays by Both Nostrils route daily.  montelukast (SINGULAIR) 10 mg tablet Take 10 mg by mouth daily.  loratadine (CLARITIN) 10 mg tablet Take 10 mg by mouth.  L-norgest/e.estradiol-e.estrad (ASHLYNA PO) Take  by mouth.  levalbuterol tartrate (XOPENEX) 45 mcg/actuation inhaler Take  by inhalation.  albuterol (PROVENTIL HFA, VENTOLIN HFA, PROAIR HFA) 90 mcg/actuation inhaler Take  by inhalation.       Lactobacillus acidophilus (PROBIOTIC PO) Take  by mouth.      ibuprofen (MOTRIN) 600 mg tablet Take  by mouth every six (6) hours as needed for Pain.  acetaminophen-caffeine 500-65 mg (EXCEDRINE TENSION HEADACHE) 500-65 mg tab Take 2 Tabs by mouth as needed for Headache. Allergies   Allergen Reactions    Joshua-Dm [Chlorpheniramine-Phenyleph-Dm] Anaphylaxis, Itching, Swelling and Other (comments)    Clarithromycin Anaphylaxis       History reviewed. No pertinent surgical history. Social History     Socioeconomic History    Marital status: UNKNOWN     Spouse name: Not on file    Number of children: Not on file    Years of education: Not on file    Highest education level: Not on file   Occupational History    Not on file   Social Needs    Financial resource strain: Not on file    Food insecurity:     Worry: Not on file     Inability: Not on file    Transportation needs:     Medical: Not on file     Non-medical: Not on file   Tobacco Use    Smoking status: Never Smoker    Smokeless tobacco: Never Used   Substance and Sexual Activity    Alcohol use: No    Drug use: Not on file    Sexual activity: Not on file   Lifestyle    Physical activity:     Days per week: Not on file     Minutes per session: Not on file    Stress: Not on file   Relationships    Social connections:     Talks on phone: Not on file     Gets together: Not on file     Attends [employer] service: Not on file     Active member of club or organization: Not on file     Attends meetings of clubs or organizations: Not on file     Relationship status: Not on file    Intimate partner violence:     Fear of current or ex partner: Not on file     Emotionally abused: Not on file     Physically abused: Not on file     Forced sexual activity: Not on file   Other Topics Concern    Not on file   Social History Narrative    Not on file       REVIEW OF SYSTEMS:      No changes from previous review of systems unless noted.     PHYSICAL EXAMINATION:  Visit Vitals  /81 (BP 1 Location: Left arm, BP Patient Position: Sitting)   Pulse 81   Temp 98.3 °F (36.8 °C) (Oral)   Resp 16   Ht 5' 2\" (1.575 m)   Wt 151 lb (68.5 kg)   BMI 27.62 kg/m²     Body mass index is 27.62 kg/m². GENERAL: Alert and oriented x3, in no acute distress. HEENT: Normocephalic, atraumatic. RESP: Non labored breathing. SKIN: No rashes or lesions noted. PHYSICAL EXAM:  Physical exam of the left thumb with decreased laxity. She does have some mild tenderness to palpation over the base of the MCP. No other findings today. IMAGING:  An MRI was reviewed, which does not show any full thickness tears of the ulnar collateral ligament of the thumb. ASSESSMENT AND PLAN:   Rhianna Jones has a sprain of the thumb ulnar collateral ligament. I have recommended bracing her two more weeks and follow up in four weeks.               Electronically signed by: Claudine Green MD

## 2019-11-08 NOTE — PROGRESS NOTES
1. Have you been to the ER, urgent care clinic since your last visit? Hospitalized since your last visit? NO    2. Have you seen or consulted any other health care providers outside of the [address] [place] [address] since your last visit? Include any pap smears or colon screening.    NO

## 2019-12-10 ENCOUNTER — OFFICE VISIT (OUTPATIENT)
Dept: ORTHOPEDIC SURGERY | Facility: CLINIC | Age: 29
End: 2019-12-10

## 2019-12-10 VITALS
DIASTOLIC BLOOD PRESSURE: 77 MMHG | RESPIRATION RATE: 16 BRPM | HEART RATE: 79 BPM | HEIGHT: 62 IN | WEIGHT: 150 LBS | BODY MASS INDEX: 27.6 KG/M2 | TEMPERATURE: 98.7 F | SYSTOLIC BLOOD PRESSURE: 107 MMHG

## 2019-12-10 DIAGNOSIS — M79.642 BILATERAL HAND PAIN: ICD-10-CM

## 2019-12-10 DIAGNOSIS — S63.642D SPRAIN OF METACARPOPHALANGEAL JOINT OF LEFT THUMB, SUBSEQUENT ENCOUNTER: Primary | ICD-10-CM

## 2019-12-10 DIAGNOSIS — M79.641 BILATERAL HAND PAIN: ICD-10-CM

## 2019-12-10 NOTE — PROGRESS NOTES
1. Have you been to the ER, urgent care clinic since your last visit? Hospitalized since your last visit? No    2. Have you seen or consulted any other health care providers outside of the [address] [place] [address] since your last visit? Include any pap smears or colon screening.  No

## 2019-12-10 NOTE — PROGRESS NOTES
Patient: James Dawson                MRN: [id number]       SSN: [id number]  YOB: 1990        AGE: 30 y.o. SEX: female  Body mass index is 27.44 kg/m². PCP: Cass Liu MD  12/10/19    Chief Complaint: Left hand follow up    HISTORY OF PRESENT ILLNESS:  Elicia Ramon returns to the office today for her left thumb injury. She is doing well. She has been out of her brace. She has been using it as tolerated with minimal symptoms. She does note some occasional pain with the weather changes and with overuse. She also notes some bilateral hand and finger pain when she types. She does have a known diagnosis of [place] and has some joint laxity. Past Medical History:   Diagnosis Date    Asthma     Chronic headaches        History reviewed. No pertinent family history. Current Outpatient Medications   Medication Sig Dispense Refill    erenumab-aooe (AIMOVIG AUTOINJECTOR) 140 mg/mL injection 140 mg by SubCUTAneous route every thirty (30) days.  norethindrone-ethinyl estradiol (JUNEL FE 1/20) 1 mg-20 mcg (21)/75 mg (7) tab Take  by mouth.  levalbuterol HCl (XOPENEX IN) Take  by inhalation.  sodium chloride 1 gram tablet Take 1 g by mouth two (2) times a day.  docosahexanoic acid/epa (FISH OIL PO) Take  by mouth.  cholecalciferol, vitamin d3, 10,000 unit cap Take  by mouth.  azelastine (ASTEPRO) 0.15 % (205.5 mcg) INSTILL 1 SPRAY IN EACH  NOSTIL BID  3    budesonide (RHINOCORT AQUA) 32 mcg/actuation nasal spray 2 Sprays by Both Nostrils route daily.  cetirizine (ZYRTEC) 10 mg tablet Take  by mouth.  fluticasone (FLONASE ALLERGY RELIEF) 50 mcg/actuation nasal spray 2 Sprays by Both Nostrils route daily.  montelukast (SINGULAIR) 10 mg tablet Take 10 mg by mouth daily.  loratadine (CLARITIN) 10 mg tablet Take 10 mg by mouth.  L-norgest/e.estradiol-e.estrad (ASHLYNA PO) Take  by mouth.       levalbuterol tartrate ([place]) 45 mcg/actuation inhaler Take  by inhalation.  albuterol (PROVENTIL HFA, VENTOLIN HFA, PROAIR HFA) 90 mcg/actuation inhaler Take  by inhalation.  Lactobacillus acidophilus (PROBIOTIC PO) Take  by mouth.  ibuprofen (MOTRIN) 600 mg tablet Take  by mouth every six (6) hours as needed for Pain.  acetaminophen-caffeine 500-65 mg (EXCEDRINE TENSION HEADACHE) 500-65 mg tab Take 2 Tabs by mouth as needed for Headache. Allergies   Allergen Reactions    Joshua-Dm [Chlorpheniramine-Phenyleph-Dm] Anaphylaxis, Itching, Swelling and Other (comments)    Clarithromycin Anaphylaxis       History reviewed. No pertinent surgical history.     Social History     Socioeconomic History    Marital status: UNKNOWN     Spouse name: Not on file    Number of children: Not on file    Years of education: Not on file    Highest education level: Not on file   Occupational History    Not on file   Social Needs    Financial resource strain: Not on file    Food insecurity:     Worry: Not on file     Inability: Not on file    Transportation needs:     Medical: Not on file     Non-medical: Not on file   Tobacco Use    Smoking status: Never Smoker    Smokeless tobacco: Never Used   Substance and Sexual Activity    Alcohol use: No    Drug use: Not on file    Sexual activity: Not on file   Lifestyle    Physical activity:     Days per week: Not on file     Minutes per session: Not on file    Stress: Not on file   Relationships    Social connections:     Talks on phone: Not on file     Gets together: Not on file     Attends Christian service: Not on file     Active member of club or organization: Not on file     Attends meetings of clubs or organizations: Not on file     Relationship status: Not on file    Intimate partner violence:     Fear of current or ex partner: Not on file     Emotionally abused: Not on file     Physically abused: Not on file     Forced sexual activity: Not on file   Other Topics Concern    Not on file   Social History Narrative    Not on file       REVIEW OF SYSTEMS:      No changes from previous review of systems unless noted. PHYSICAL EXAMINATION:  Visit Vitals  /77   Pulse 79   Temp 98.7 °F (37.1 °C) (Oral)   Resp 16   Ht 5' 2\" (1.575 m)   Wt 150 lb (68 kg)   BMI 27.44 kg/m²     Body mass index is 27.44 kg/m². GENERAL: Alert and oriented x3, in no acute distress. HEENT: Normocephalic, atraumatic. RESP: Non labored breathing. SKIN: No rashes or lesions noted. PHYSICAL EXAM:  Physical exam of the left hand is nontender to palpation over the MCP joint of the thumb. No obvious pain with radial or ulnar deviation of the thumb MCP joint. She has full range of motion. Full sensation. She does have some vague complaints of pain in the joints, but no specific findings in either hand. ASSESSMENT AND PLAN:   Robert Ramírez is doing well recovering from her left thumb injury. She has a vague complaint of bilateral hand pain. I would like for her to see Dr. Bud Quiñones to have him lay his eyes on it. There may not be anything to do for it, other than maybe some occupational therapy, but at this point I would to get him to weigh in on it. I will put in a referral for that today. I will plan to see her back as needed.              Electronically signed by: Huma Mendez MD

## 2020-01-15 ENCOUNTER — OFFICE VISIT (OUTPATIENT)
Dept: ORTHOPEDIC SURGERY | Facility: CLINIC | Age: 30
End: 2020-01-15

## 2020-01-15 VITALS
HEART RATE: 84 BPM | WEIGHT: 147 LBS | OXYGEN SATURATION: 99 % | TEMPERATURE: 97.9 F | DIASTOLIC BLOOD PRESSURE: 80 MMHG | RESPIRATION RATE: 16 BRPM | HEIGHT: 62 IN | SYSTOLIC BLOOD PRESSURE: 102 MMHG | BODY MASS INDEX: 27.05 KG/M2

## 2020-01-15 DIAGNOSIS — M79.642 PAIN IN BOTH HANDS: ICD-10-CM

## 2020-01-15 DIAGNOSIS — M79.641 PAIN IN BOTH HANDS: ICD-10-CM

## 2020-01-15 DIAGNOSIS — Q79.60 EHLERS-DANLOS SYNDROME: ICD-10-CM

## 2020-01-15 DIAGNOSIS — G56.03 BILATERAL CARPAL TUNNEL SYNDROME: Primary | ICD-10-CM

## 2020-01-15 NOTE — PROGRESS NOTES
Renate Alamo is a 30 y.o. female right handed unknown employment. Worker's Compensation and legal considerations: not known. Vitals:    01/15/20 1454   BP: 102/80   Pulse: 84   Resp: 16   Temp: 97.9 °F (36.6 °C)   TempSrc: Oral   SpO2: 99%   Weight: 147 lb (66.7 kg)   Height: 5' 2\" (1.575 m)   PainSc:   3   PainLoc: Hand           Chief Complaint   Patient presents with    Hand Pain     Markel         HPI: Patient comes in today with complaints of bilateral hand pain instability and weakness. She also reports numbness and tingling in both hands. 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 Date of onset: Indeterminate    Injury: No    Prior Treatment:  No    Numbness/ Tingling: Yes: Comment: Bilateral hands    ROS: Review of Systems - General ROS: negative  Respiratory ROS: no cough, shortness of breath, or wheezing  Cardiovascular ROS: no chest pain or dyspnea on exertion  Musculoskeletal ROS: positive for - pain in hand - bilateral  Neurological ROS: positive for - numbness/tingling  Dermatological ROS: negative    Past Medical History:   Diagnosis Date    Asthma     Chronic headaches        History reviewed. No pertinent surgical history. Current Outpatient Medications   Medication Sig Dispense Refill    erenumab-aooe (AIMOVIG AUTOINJECTOR) 140 mg/mL injection 140 mg by SubCUTAneous route every thirty (30) days.  sodium chloride 1 gram tablet Take 1 g by mouth two (2) times a day.  docosahexanoic acid/epa (FISH OIL PO) Take  by mouth.  cholecalciferol, vitamin d3, 10,000 unit cap Take  by mouth.  azelastine (ASTEPRO) 0.15 % (205.5 mcg) INSTILL 1 SPRAY IN EACH  NOSTIL BID  3    loratadine (CLARITIN) 10 mg tablet Take 10 mg by mouth.  levalbuterol tartrate (XOPENEX) 45 mcg/actuation inhaler Take  by inhalation.       albuterol (PROVENTIL HFA, VENTOLIN HFA, PROAIR HFA) 90 mcg/actuation inhaler Take  by inhalation.  Lactobacillus acidophilus (PROBIOTIC PO) Take  by mouth.  ibuprofen (MOTRIN) 600 mg tablet Take  by mouth every six (6) hours as needed for Pain.  acetaminophen-caffeine 500-65 mg (EXCEDRINE TENSION HEADACHE) 500-65 mg tab Take 2 Tabs by mouth as needed for Headache.  norethindrone-ethinyl estradiol (JUNEL FE 1/20) 1 mg-20 mcg (21)/75 mg (7) tab Take  by mouth.  levalbuterol HCl (XOPENEX IN) Take  by inhalation.  budesonide (RHINOCORT AQUA) 32 mcg/actuation nasal spray 2 Sprays by Both Nostrils route daily.  cetirizine (ZYRTEC) 10 mg tablet Take  by mouth.  fluticasone (FLONASE ALLERGY RELIEF) 50 mcg/actuation nasal spray 2 Sprays by Both Nostrils route daily.  montelukast (SINGULAIR) 10 mg tablet Take 10 mg by mouth daily.  L-norgest/e.estradiol-e.estrad (ASHLYNA PO) Take  by mouth. Allergies   Allergen Reactions    Joshua-Dm [Chlorpheniramine-Phenyleph-Dm] Anaphylaxis, Itching, Swelling and Other (comments)    Clarithromycin Anaphylaxis         PE:     Bilateral there is no tenderness to palpation or any gross instability about the joints of both hands. Sensation is intact distally and cap refill is brisk. Hands: Patient has full range of motion in all digits. NEUROVASCULAR    Examination L R Examination L R   Carpal Comp. + + Pronator Comp. - -   Carpal Tinel + + Pronator Tinel - -   Phalen's + + Pronator Stress - -   Cubital Comp. - - Guyon Comp. - -   Cubital Tinel - - Guyon Tinel - -   Elbow Hyperflexion - - Adson's - -   Spurling's - - SC Comp. - -   PCB Median abn - - SC Tinel - -   Radial Tinel - - IC Comp.  - -   Digital Tinel - - IC Tinel - -   Radial 2-Pt WNL WNL Ulnar 2-Pt WNL WNL     Radial Pulse: 2+  Capillary Refill: < 2 sec  [employer]: Not Performed  [employer]: Not Performed      Imaging: None indicated today        ICD-10-CM ICD-9-CM    1. Bilateral carpal tunnel syndrome G56.03 354.0 REFERRAL TO OCCUPATIONAL THERAPY      AMB SUPPLY ORDER      EMG TWO EXTREMITIES UPPER      NCV/LAT MOTOR PER NERVE UP/RT      NCV/LAT MOTOR PER NERVE UP/LT   2. Pain in both hands M79.641 729.5 REFERRAL TO OCCUPATIONAL THERAPY    M79.642     3. Simon-Danlos syndrome Q79.60 756.83 REFERRAL TO OCCUPATIONAL THERAPY       Plan: Bilateral upper extremity EMG and nerve conduction studies    Bilateral carpal tunnel braces for nighttime wear    OT referral for range of motion exercises stability exercises strengthening and possible splints as indicated.   Follow-up after EMG nerve conduction study    Plan was reviewed with patient, who verbalized agreement and understanding of the plan

## 2020-01-27 ENCOUNTER — HOSPITAL ENCOUNTER (OUTPATIENT)
Dept: PHYSICAL THERAPY | Age: 30
Discharge: HOME | End: 2020-01-27
Payer: COMMERCIAL

## 2020-01-27 PROCEDURE — 97535 SELF CARE MNGMENT TRAINING: CPT

## 2020-01-27 PROCEDURE — 97166 OT EVAL MOD COMPLEX 45 MIN: CPT

## 2020-01-27 NOTE — PROGRESS NOTES
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]  Payor: BLUE CROSS / Plan: [address] [facility] [place] / Product Type: PPO /  Onset Date:recent diagnosis    Treatment Diagnosis: Pain both hands   Prior Hospitalization: see medical history Provider#: [id number]   Medications: Verified on Patient summary List    Comorbidities: Chronic pain, chronic migraines, avulsion fracture right wrist   Prior Level of Function: Works on computer all day, 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 Assessment/ key information: Patient  is a right hand dominant, 30 y.o.y/o, female who  has had Gunjan Null diagnosis for 1 year. with longstanding complaints of pain, tingling in both UEs, back and LEs. She has additional history of right wrist fracture. She reports tingling in back (dorsum)of both wrists and forearms to upper arm. she is scheduled for EMG. She sees a chiropractor for ongoing back pain. Her pain today is 3-4/10 and she states this is a good day for her. Her strength is [facility] with some weak ness noted in both shoulders, for which she has received physical therapy previously. .  She reports difficulty with lifting water bottle, zipping clothes, opening jars and containers and holding texting on phone. She states computer use at work has so far been unaffected.   She will benefit from skilled occupational therapy to provide information on adaptive equipment and devices, potential support splints to reduce hyperextension in DIPs, and task modification. Evaluation Complexity: History MEDIUM Complexity : Expanded review of history including physical, cognitive and psychosocial  history  Examination MEDIUM Complexity : 3-5 performance deficits relating to physical, cognitive , or psychosocial skils that result in activity limitations and / or participation restrictions Clinical Decision Making MEDIUM Complexity : Patient may present with comorbidities that affect occupational performnce. Miniml to moderate modification of tasks or assistance (eg, physical or verbal ) with assesment(s) is necessary to enable patient to complete evaluation   Overall Complexity Rating: MEDIUM    Problem List: Pain effecting function, Decreased ADL/functional abilities , Decreased activity tolerance and Sensability     Treatment Plan may include any combination of the following: Therapeutic exercise, Therapeutic activities, Physical agent/modality, Splinting/orthoses, Patient education and ADLs/IADLs    Patient / Family readiness to learn indicated by: asking questions, trying to perform skills and interest    Persons(s) to be included in education:   patient (P)    Barriers to Learning/Limitations: None    Patient Goal (s): Prevent degeneration and improve pain    Patient Self Reported Health Status: fair    Rehabilitation Potential: fair    Short Term Goals: To be accomplished in 2 weeks:  1. Patient will be introduced to joint protection techniques to reduce injury risk to joints from overuse of unstable joints. 2. Patient will be familiar with pain management strategies for use at home  3. Patient will be independent in HEP for strengthening as tolerated. Long Term Goals: To be accomplished in 8 treatments:   1. Patient will be familiar with joint protection strategies and adaptive equipment to increase ease of self-care and home care activities  2.   Patient will report improved performance of self and home care using adaptive techniques and equipment as well as orthoses as needed as shown by increase in FOTO of at least 5 points. Frequency / Duration: Patient to be seen 1 times per week for 8 weeks:(Patient request due to scheduling issues)    Patient/ Caregiver education and instruction: Diagnosis, prognosis, self care, activity modification, brace/ splint application and exercises   [x]  Plan of care has been reviewed with CAROL Cox 1/27/2020 4:34 PM    _____________________________________________________________________    I certify that the above Therapy Services are being furnished while the patient is under my care. I agree with the treatment plan and certify that this therapy is necessary.     Physician's Signature:____________Date:_________TIME:________    ** Signature, Date and Time must be completed for valid certification **    Please sign and return to [facility] [employer] [facility] [employer] [facility]   [address] [place] [address]  [phone] fax

## 2020-01-27 NOTE — PROGRESS NOTES
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   OBJECTIVE    Modality rationale: decrease pain to improve the patients ability to grasp   Min Type Additional Details    [] Estim:  []Unatt       []IFC  []Premod                        []Other:  []w/ice   []w/heat  Position:  Location:    [] Estim: []Att    []TENS instruct  []NMES                    []Other:  []w/US   []w/ice   []w/heat  Position:  Location:    []  Traction: [] Cervical       []Lumbar                       [] Prone          []Supine                       []Intermittent   []Continuous Lbs:  [] before manual  [] after manual    []  Ultrasound: []Continuous   [] Pulsed                           []1MHz   []3MHz W/cm2:  Location:    []  Iontophoresis with dexamethasone         Location: [] Take home patch   [] In clinic   10 []  Ice     [x]  heat  []  Ice massage  []  Laser   []  Anodyne Position:  Location:both hands    []  Laser with stim  []  Other:  Position:  Location:    []  Vasopneumatic Device Pressure:       [] lo [] med [] hi   Temperature: [] lo [] med [] hi       [x] Skin assessment post-treatment:  [x]intact []redness- no adverse reaction    []redness - adverse reaction:     25 min []Eval                  []Re-Eval         10 min Self Care/Home Management: JOint protection   Rationale: task modifications  to improve the patients ability to perfrom self and home care  Issued joint proteciton home program informaiton    With   [] TE   [] TA   [] neuro   [] other: Patient Education: [x] Review HEP    [] Progressed/Changed HEP based on: joint protection, OT POC  [] positioning   [] body mechanics   [] transfers   [] heat/ice application   [] Splint wear/care   [] Sensory re-education   [] scar management      [] other:            Other Objective/Functional Measures:   Subjective: pt is a right hand dominant, 29 y.o.y/o, female who sustained has had history of Simon Danlos diagnosis for 1 year. Prior level of function: I self care home care, driving, computer video phone games, reading  Pain level:(0-no pain 10-debilitating pain) moderate    Description/Location: both sides hand and both sides wrist with c/o no, minimal relief   Worst pain7-8/10 Least pain 2/10   Activities which aggravate pain: use   Activities which ease pain: time  Current functional limitations/living situation: I self care home care driving, opening jars, lifting water bottle, computer use, writing,  Driving, using tool, pushing buttons, opening fridge, zipping, swiping phone, holding phone,     Medical hx: Avulsion fracture right wrist , chronic HA    Medications: See the written copy of this report in the patient's paper medical record.        Objective:  Edema: Circumference    Right  Left  Reports edema in dorsal hand and wrist on right  Sensation:Tingling dorsal wrist and occasionally around forearm and upper arm    alejandra of Motion:WNL  Strength:    Right Left   Shoulder Flex 4= 4+          abd 4+ 4+                     Elbow Ext/flex 5 5   Forearm            Wrist Flex 4+ 4+    Ext 4+ 4+                 Hand ROM WNLHand Strength:   Gross Grasp 3pt Pinch Lateral Pinch Tip Pinch   Right  62 16 15 11   Left 48 14 14 8     Nine-Hole Peg Test:  Left= _15____seconds  Right=___15__seconds    Palpation: NT    ADLs  Feeding:        []MaxA   []ModA   [x]Candice   [] CGA   []SBA   []Aleksandar   []Independent  UE Dressing:       []MaxA   []ModA   [x]Candice   [] CGA   []SBA   []Aleksandar   []Independent  LE Dressing:       []MaxA   []ModA   [x]Candice   [] CGA   []SBA   []Aleksandar   []Independent  Grooming:       []MaxA   []ModA   [x]Candice   [] CGA   []SBA   []Aleksandar   []Independent  Toileting:       []MaxA   []ModA   []Candice   [] CGA   []SBA   []Aleksandar   []Independent  Bathing:       []MaxA   []ModA   [x]Candice   [] CGA   []SBA   []Aleksandar   []Independent  Light Meal Prep:    []MaxA   [x]ModA   []Candice   [] CGA   []SBA   []Aleksandar   []Independent  Household/Other: []MaxA   [x]ModA   []Candice   [] CGA   []SBA   []Aleksandar   []Independent  Adaptive Equip:     []MaxA   []ModA   []Candice   [] CGA   []SBA   []Aleksandar   []Independent  Driving:       []MaxA   [x]ModA   []Candice   [] CGA   []SBA   []Aleksandar   []Independent  Money Mgmt:        []MaxA   []ModA   [x]Candice   [] CGA   []SBA   []Aleksandar   []Independent       Pain Level (0-10 scale) post treatment: 1/10    ASSESSMENT/Changes in Function:      [x]  See Plan of Care  []  See progress note/recertification  []  See Discharge Summary             PLAN  []  Upgrade activities as tolerated     []  Continue plan of care  []  Update interventions per flow sheet       []  Discharge due to:_  []  Other:_      Carmen Bishop OTR/L 1/27/2020  4:20 PM    Future Appointments   Date Time Provider Tena Dickerson   1/27/2020  4:45 PM Dickie Home [id number]   2/14/2020  3:30 PM Geri Goldman  [address]

## 2020-02-05 ENCOUNTER — HOSPITAL ENCOUNTER (OUTPATIENT)
Dept: PHYSICAL THERAPY | Age: 30
Discharge: HOME | End: 2020-02-05
Payer: COMMERCIAL

## 2020-02-05 PROCEDURE — 97530 THERAPEUTIC ACTIVITIES: CPT

## 2020-02-05 PROCEDURE — 97110 THERAPEUTIC EXERCISES: CPT

## 2020-02-05 NOTE — PROGRESS NOTES
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 Estim:  []? Unatt       []? IFC  []? Premod                        []?Other:  []?w/ice   []?w/heat  Position:  Location:      []? Estim: []? Att    []? TENS instruct  []? NMES                    []?Other:  []?w/US   []?w/ice   []?w/heat  Position:  Location:      []? Traction: []? Cervical       []? Lumbar                       []? Prone          []? Supine                       []?Intermittent   []? Continuous Lbs:  []? before manual  []? after manual      []? Ultrasound: []? Continuous   []? Pulsed                           []? 1MHz   []? 3MHz W/cm2:  Location:      []? Iontophoresis with dexamethasone         Location: []? Take home patch   []? In clinic    10 []? Ice     [x]? heat  []? Ice massage  []? Laser   []? Anodyne Position:  Location:both hands      []? Laser with stim  []? Other:  Position:  Location:      []? Vasopneumatic Device Pressure:       []? lo []? med []? hi   Temperature: []? lo []? med []? hi       [x]? Skin assessment post-treatment:  [x]? intact []? redness- no adverse reaction    []? redness  adverse reaction:     20 min Therapeutic Exercise:  [] See flow sheet :   Rationale: increase ROM and increase strength to improve the patients ability to , functionally use hand     Dexterity Balls: B Hands  Soft Theraputty: R/L to reveal/remove 1/4 in pegs 10/10     10 min Therapeutic Activity:  []  See flow sheet :   Rationale: increase ROM, improve coordination and education on adaptive equipment   to improve the patients ability to perform ADL/IADL's independently, manipulate small items     Review Joint Protection/Pain Relief         With   [] TE   [] TA   [] neuro   [] other: Patient Education: [x] Review HEP    [] Progressed/Changed HEP based on:   [] positioning   [] body mechanics   [] transfers   [] heat/ice application   [] Splint wear/care   [] Sensory re-education   [] scar management      [] other:            Other Objective/Functional Measures:     Able to tolerate Dexterity Balls and Soft theraputty      Pain Level (0-10 scale) post treatment: Right 4/10, Left 2/10    ASSESSMENT/Changes in Function: Education on joint protection improving, check for carryover     Patient will continue to benefit from skilled OT services to modify and progress therapeutic interventions, address ROM deficits, address strength deficits and instruct in home and community integration to attain remaining goals. []  See Plan of Care  []  See progress note/recertification  []  See Discharge Summary         Progress towards goals / Updated goals:  Short Term Goals: To be accomplished in 2 weeks:  1. Patient will be introduced to joint protection techniques to reduce injury risk to joints from overuse of unstable joints. Hand out provided, reviewed on this date 2/5/20  2. Patient will be familiar with pain management strategies for use at home Initiated 2/5/20  3.   Patient will be independent in HEP for strengthening as tolerated. Progressing 2/5/20     Long Term Goals: To be accomplished in 8 treatments:   1. Patient will be familiar with joint protection strategies and adaptive equipment to increase ease of self-care and home care activities  2.   Patient  will report improved performance of self and home care using adaptive techniques and equipment as well as orthoses as needed as shown by increase in FOTO of at least 5 points.         PLAN  []  Upgrade activities as tolerated     [x]  Continue plan of care  []  Update interventions per flow sheet       []  Discharge due to:_  []  Other:_      Anola Mires ,ANDREW/L 2/5/2020  3:28 PM    Future Appointments   Date Time Provider Tena Dickerson   2/12/2020  3:30 PM Tiffanie Soliman, OTR/L [facility]   2/14/2020  3:30 PM Ileana Reddy  [address]   2/19/2020  3:30 PM Devora Gordillo [facility]   2/20/2020  3:30 PM DO Young Ambrose Coleman [address]   2/26/2020  3:30 PM Tiffanie Soliman OTR/L [facility]   3/4/2020  3:30 PM Devoradimas Gordillo [id number] [facility]   3/13/2020  3:30 PM Devora Gordillo AWYRYAT [facility]   3/18/2020  3:30 PM Devora Gordillo [id number] [facility]   3/25/2020  3:30 PM Tiffanie Soliman, OTR/L [facility]

## 2020-02-12 ENCOUNTER — APPOINTMENT (OUTPATIENT)
Dept: PHYSICAL THERAPY | Age: 30
End: 2020-02-12
Payer: COMMERCIAL

## 2020-02-14 ENCOUNTER — OFFICE VISIT (OUTPATIENT)
Dept: ORTHOPEDIC SURGERY | Age: 30
End: 2020-02-14

## 2020-02-14 VITALS
RESPIRATION RATE: 24 BRPM | OXYGEN SATURATION: 99 % | SYSTOLIC BLOOD PRESSURE: 113 MMHG | BODY MASS INDEX: 26.68 KG/M2 | TEMPERATURE: 98 F | WEIGHT: 145 LBS | HEART RATE: 88 BPM | DIASTOLIC BLOOD PRESSURE: 78 MMHG | HEIGHT: 62 IN

## 2020-02-14 DIAGNOSIS — R20.0 NUMBNESS AND TINGLING IN BOTH HANDS: Primary | ICD-10-CM

## 2020-02-14 DIAGNOSIS — R20.2 NUMBNESS AND TINGLING IN BOTH HANDS: Primary | ICD-10-CM

## 2020-02-14 NOTE — PROGRESS NOTES
[address]  [place], [address] [place] [address] Street  Phone: [phone]  Fax: [phone]        Eugenie Del Toro  : 1990  PCP: Tim Wade MD  2020    ELECTROMYOGRAPHY AND NERVE CONDUCTION STUDIES    Stephanie North was referred by Dr. Maryellen Forman for electrodiagnostic evaluation of bilateral hand numbness and tingling. NCV & EMG Findings:  All nerve conduction studies (as indicated in the following tables) were within normal limits. All left vs. right side differences were within normal limits. All examined muscles (as indicated in the following table) showed no evidence of electrical instability. INTERPRETATION    This was a normal nerve conduction and EMG study showing there to be no signs of neuropathy, myopathy, or radiculopathy in the nerves and muscles tested. CLINICAL INTERPRETATION  There are no electrodiagnostic findings correlating with her symptoms. Her diffuse paresthesias may be related to sensory hypersensitivity. HISTORY OF PRESENT ILLNESS  Camila Ortiz is a 30 y.o. female. Pt presents today for BUE EMG evaluation of bilateral hand pain, numbness, and tingling. Pt notes that she feels numbness and tingling in the dorsal aspect of the hands radiating into all digits. 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 She works as a . PAST MEDICAL HISTORY   Past Medical History:   Diagnosis Date    Asthma     Chronic headaches        History reviewed. No pertinent surgical history. [facility] MEDICATIONS    Current Outpatient Medications   Medication Sig Dispense Refill    erenumab-aooe (AIMOVIG AUTOINJECTOR) 140 mg/mL injection 140 mg by SubCUTAneous route every thirty (30) days.  norethindrone-ethinyl estradiol (JUNEL FE ) 1 mg-20 mcg (21)/75 mg (7) tab Take  by mouth.  levalbuterol HCl (XOPENEX IN) Take  by inhalation.       sodium chloride 1 gram tablet Take 1 g by mouth two (2) times a day.  docosahexanoic acid/epa (FISH OIL PO) Take  by mouth.  cholecalciferol, vitamin d3, 10,000 unit cap Take  by mouth.  azelastine (ASTEPRO) 0.15 % (205.5 mcg) INSTILL 1 SPRAY IN EACH  NOSTIL BID  3    budesonide (RHINOCORT AQUA) 32 mcg/actuation nasal spray 2 Sprays by Both Nostrils route daily.  cetirizine (ZYRTEC) 10 mg tablet Take  by mouth.  fluticasone (FLONASE ALLERGY RELIEF) 50 mcg/actuation nasal spray 2 Sprays by Both Nostrils route daily.  montelukast (SINGULAIR) 10 mg tablet Take 10 mg by mouth daily.  loratadine (CLARITIN) 10 mg tablet Take 10 mg by mouth.  L-norgest/e.estradiol-e.estrad (ASHLYNA PO) Take  by mouth.  levalbuterol tartrate (XOPENEX) 45 mcg/actuation inhaler Take  by inhalation.  albuterol (PROVENTIL HFA, VENTOLIN HFA, PROAIR HFA) 90 mcg/actuation inhaler Take  by inhalation.  Lactobacillus acidophilus (PROBIOTIC PO) Take  by mouth.  ibuprofen (MOTRIN) 600 mg tablet Take  by mouth every six (6) hours as needed for Pain.  acetaminophen-caffeine 500-65 mg (EXCEDRINE TENSION HEADACHE) 500-65 mg tab Take 2 Tabs by mouth as needed for Headache. ALLERGIES  Allergies   Allergen Reactions    Joshua-Dm [Chlorpheniramine-Phenyleph-Dm] Anaphylaxis, Itching, Swelling and Other (comments)    Clarithromycin Anaphylaxis          SOCIAL HISTORY    Social History     Socioeconomic History    Marital status: UNKNOWN     Spouse name: Not on file    Number of children: Not on file    Years of education: Not on file    Highest education level: Not on file   Tobacco Use    Smoking status: Never Smoker    Smokeless tobacco: Never Used   Substance and Sexual Activity    Alcohol use: No    Drug use: Never       FAMILY HISTORY  No family history on file.       PHYSICAL EXAMINATION  Visit Vitals  /78 (BP 1 Location: Right arm, BP Patient Position: Sitting)   Pulse 88   Temp 98 °F (36.7 °C) (Oral)   Resp 24 Ht 5' 2\" (1.575 m)   Wt 145 lb (65.8 kg)   SpO2 99%   BMI 26.52 kg/m²       Pain Assessment  2/14/2020   Location of Pain Hand   Pain Location Comment -   Location Modifiers -   Severity of Pain 5   Quality of Pain Aching; Sharp   Quality of Pain Comment \"STABBING, TINGLING, WEAKNESS\"   Duration of Pain Persistent   Frequency of Pain Constant   Aggravating Factors -   Aggravating Factors Comment -   Limiting Behavior -   Relieving Factors -   Relieving Factors Comment -   Result of Injury No   Work-Related Injury -   Type of Injury -   Type of Injury Comment -           Constitutional:  Well developed, well nourished, in no acute distress. Psychiatric: Affect and mood are appropriate. Integumentary: No rashes or abrasions noted on exposed areas.         NCV & EMG Findings:  Nerve Conduction Studies  Anti Sensory Summary Table     Stim Site NR Peak (ms) Norm Peak (ms) P-T Amp (µV) Norm P-T Amp Site1 Site2 Delta-P (ms) Dist (cm) Guillermo (m/s) Norm Guillermo (m/s)   Left Median Anti Sensory (2nd Digit)   Wrist    3.2 <3.6 114.7 >10 Wrist 2nd Digit 3.2 14.0 44 >39   Right Median Anti Sensory (2nd Digit)   Wrist    3.2 <3.6 83.6 >10 Wrist 2nd Digit 3.2 14.0 44 >39   Left Radial Anti Sensory (Base 1st Digit)   Wrist    2.0 <3.1 89.2  Wrist Base 1st Digit 2.0 0.0     Right Radial Anti Sensory (Base 1st Digit)   Wrist    2.0 <3.1 68.7  Wrist Base 1st Digit 2.0 0.0     Left Ulnar Anti Sensory (5th Digit)   Wrist    3.2 <3.7 61.8 >15.0 Wrist 5th Digit 3.2 14.0 44 >38   Right Ulnar Anti Sensory (5th Digit)   Wrist    3.1 <3.7 83.4 >15.0 Wrist 5th Digit 3.1 14.0 45 >38     Motor Summary Table     Stim Site NR Onset (ms) Norm Onset (ms) O-P Amp (mV) Norm O-P Amp Site1 Site2 Delta-0 (ms) Dist (cm) Guillermo (m/s) Norm Guillermo (m/s)   Left Median Motor (Abd Poll Brev)   Wrist    3.6 <4.2 5.6 >5 Elbow Wrist 3.0 18.5 62 >50   Elbow    6.6  5.7          Right Median Motor (Abd Poll Brev)   Wrist    3.6 <4.2 10.2 >5 Elbow Wrist 2.8 18.0 64 >50 Elbow    6.4  10.2          Left Ulnar Motor (Abd Dig Min)   Wrist    2.7 <4.2 7.9 >3 B Elbow Wrist 2.5 15.0 60 >53   B Elbow    5.2  7.7  A Elbow B Elbow 1.9 10.0 53 >53   A Elbow    7.1  8.0          Right Ulnar Motor (Abd Dig Min)   Wrist    2.6 <4.2 10.3 >3 B Elbow Wrist 2.4 14.5 60 >53   B Elbow    5.0  10.2  A Elbow B Elbow 1.7 10.0 59 >53   A Elbow    6.7  10.0            EMG     Side Muscle Nerve Root Ins Act Fibs Psw Amp Dur Poly Recrt Int [facility] April Comment   Right Biceps Musculocut C5-6 Nml Nml Nml Nml Nml 0 Nml Nml    Right Triceps Radial C6-7-8 Nml Nml Nml Nml Nml 0 Nml Nml    Right PronatorTeres Median C6-7 Nml Nml Nml Nml Nml 0 Nml Nml    Right Abd Poll Brev Median C8-T1 Nml Nml Nml Nml Nml 0 Nml Nml    Right 1stDorInt Ulnar C8-T1 Nml Nml Nml Nml Nml 0 Nml Nml    Left Biceps Musculocut C5-6 Nml Nml Nml Nml Nml 0 Nml Nml    Left Triceps Radial C6-7-8 Nml Nml Nml Nml Nml 0 Nml Nml    Left PronatorTeres Median C6-7 Nml Nml Nml Nml Nml 0 Nml Nml    Left Abd Poll Brev Median C8-T1 Nml Nml Nml Nml Nml 0 Nml Nml    Left 1stDorInt Ulnar C8-T1 Nml Nml Nml Nml Nml 0 Nml Nml        Nerve Conduction Studies  Anti Sensory Left/Right Comparison     Stim Site L Lat (ms) R Lat (ms) L-R Lat (ms) L Amp (µV) R Amp (µV) L-R Amp (%) Site1 Site2 L Guillermo (m/s) R Guillermo (m/s) L-R Guillermo (m/s)   Median Anti Sensory (2nd Digit)   Wrist 3.2 3.2 0.0 114.7 83.6 27.1 Wrist 2nd Digit 44 44 0   Radial Anti Sensory (Base 1st Digit)   Wrist 2.0 2.0 0.0 89.2 68.7 23.0 Wrist Base 1st Digit      Ulnar Anti Sensory (5th Digit)   Wrist 3.2 3.1 0.1 61.8 83.4 25.9 Wrist 5th Digit 44 45 1     Motor Left/Right Comparison     Stim Site L Lat (ms) R Lat (ms) L-R Lat (ms) L Amp (mV) R Amp (mV) L-R Amp (%) Site1 Site2 L Guillermo (m/s) R Guillermo (m/s) L-R Guillermo (m/s)   Median Motor (Abd Poll Brev)   Wrist 3.6 3.6 0.0 5.6 10.2 45.1 Elbow Wrist 62 64 2   Elbow 6.6 6.4 0.2 5.7 10.2 44.1        Ulnar Motor (Abd Dig Min)   Wrist 2.7 2.6 0.1 7.9 10.3 23.3 B Elbow Wrist 60 60 0 B Elbow 5.2 5.0 0.2 7.7 10.2 24.5 A Elbow B Elbow 53 59 6   A Elbow 7.1 6.7 0.4 8.0 10.0 20.0              Waveforms:                                  [facility]  OFFICE PROCEDURE PROGRESS NOTE        Chart reviewed for the following:   Bob GRIMALDO, have reviewed the History, Physical and updated the Allergic reactions for [address] performed immediately prior to start of procedure:   Bob GRIMALDO, have performed the following reviews on [facility] 25 prior to the start of the procedure:            * Patient was identified by name and date of birth   * Agreement on procedure being performed was verified  * Risks and Benefits explained to the patient  * Procedure site verified and marked as necessary  * Patient was positioned for comfort  * Consent was signed and verified     Time: 4:29 PM      Date of procedure: 2/14/2020    Procedure performed by:  Angel Crandall MD    Provider accompanied by: Truong. Patient accompanied by:  Mother    How tolerated by patient: tolerated the procedure well with no complications    Post Procedural Pain Scale: 2 - Hurts Little Bit    Comments: none    Written by Luis Dove as dictated by Bob Cooney MD

## 2020-02-14 NOTE — LETTER
2/14/20 Patient: Joesph Villegas YOB: 1990 Date of Visit: 2/14/2020 Brandyn Henley MD 
[address] [facility] [address] [place] [address] VIA Facsimile: [phone] [address] [place] [address] [address] [address] [address] VIA In Basket Tristin Saleh MD 
[address] [facility] [address] [place] [address] VIA In Basket Dear MD Girish Candelario DO Buford Sanger, MD, Thank you for referring Ms. Sonya Eid to [facility] for evaluation. My notes for this consultation are attached. If you have questions, please do not hesitate to call me. I look forward to following your patient along with you.  
 
 
Sincerely, 
 
Junior Peres MD

## 2020-02-19 ENCOUNTER — HOSPITAL ENCOUNTER (OUTPATIENT)
Dept: PHYSICAL THERAPY | Age: 30
Discharge: HOME | End: 2020-02-19
Payer: COMMERCIAL

## 2020-02-19 PROCEDURE — 97530 THERAPEUTIC ACTIVITIES: CPT

## 2020-02-19 PROCEDURE — 97535 SELF CARE MNGMENT TRAINING: CPT

## 2020-02-19 NOTE — PROGRESS NOTES
OT DAILY TREATMENT NOTE 10-18    Patient Name: Meeun Ward  Date:2020  : 1990  [x]  Patient  Verified  Payor: BLUE CROSS / Plan: [employer] [facility] [place] / Product Type: PPO /    In time:325  Out time:410  Total Treatment Time (min): 45  Visit #: 3 of 8    Medicare/BCBS Only   Total Timed Codes (min):  45 1:1 Treatment Time:  45     Treatment Area: Pain in right hand [M79.641]  Pain in left hand [M79.642]  Carpal tunnel syndrome, bilateral upper limbs [G56.03]  Simon-Danlos syndrome, unspecified [Q79.60]    SUBJECTIVE  Pain Level (0-10 scale): 2/10  Any medication changes, allergies to medications, adverse drug reactions, diagnosis change, or new procedure performed?: [x] No    [] Yes (see summary sheet for update)  Subjective functional status/changes:   [] No changes reported  Patient reports hands are okay today but reports spikes of 9/10 over weekend. OBJECTIVE  Modality rationale: decrease pain to improve the patients ability to grasp   Min Type Additional Details       []? ? Estim:  []?? Unatt       []? ?IFC  []? ?Premod                        []? ? Other:  []??w/ice   []? ?w/heat  Position:  Location:       []? ? Estim: []??Att    []? ?TENS instruct  []? ?NMES                    []? ? Other:  []??w/US   []? ?w/ice   []? ?w/heat  Position:  Location:       []? ?  Traction: []?? Cervical       []? ?Lumbar                       []? ? Prone          []? ?Supine                       []? ?Intermittent   []? ? Continuous Lbs:  []?? before manual  []? ? after manual       []? ?  Ultrasound: []??Continuous   []? ? Pulsed                           []? ?1MHz   []? ? 3MHz W/cm2:  Location:       []? ?  Iontophoresis with dexamethasone         Location: []?? Take home patch   []? ? In clinic     10 []??  Ice     [x]? ?  heat  []? ?  Ice massage  []? ?  Laser   []? ?  Anodyne Position:  Location:both hands during joint protection education/review       []? ?  Laser with stim  []? ?  Other:  Position:  Location:       []??  Vasopneumatic Device Pressure:       []? ? lo []? ? med []?? hi   Temperature: []?? lo []? ? med []?? hi        [x]? ? Skin assessment post-treatment:  [x]? ? intact []? ?redness- no adverse reaction  []? ?redness  adverse reaction:       10 min Therapeutic Activity:  []  See flow sheet :   Rationale: joint protection strategies  to improve the patients ability to functionally use hands with decreased pain    Joint Protection Review- Oval 8 administered for Right IF DIP to trial for decreased hyperextension     35 min Self Care/Home Management: Adaptive Equipment    Rationale: Adaptive Equipment/Devices  to improve the patients ability to perform ADL/IADL's with increased independence and decreased pain     Education on various adaptive tools to assist with adl's       With   [] TE   [] TA   [] neuro   [] other: Patient Education: [x] Review HEP    [] Progressed/Changed HEP based on:   [] positioning   [] body mechanics   [] transfers   [] heat/ice application   [] Splint wear/care   [] Sensory re-education   [] scar management      [] other:            Other Objective/Functional Measures:     Oval 8- Size 4  Ordering information administered with various AE   Elastic Laces administered        Pain Level (0-10 scale) post treatment: 2/10    ASSESSMENT/Changes in Function: function slowly improving    Patient will continue to benefit from skilled OT services to modify and progress therapeutic interventions, address ROM deficits, address strength deficits and instruct in home and community integration to attain remaining goals. []  See Plan of Care  []  See progress note/recertification  []  See Discharge Summary         Progress towards goals / Updated goals:  Short Term Goals: To be accomplished in 2 weeks:  1.  Patient will be introduced to joint protection techniques to reduce injury risk to joints from overuse of unstable joints.  Hand out provided, reviewed on this date 2/5/20  2.  Patient will be familiar with pain management strategies for use at home Initiated 2/5/20  3.  Patient will be independent in [facility] for strengthening as tolerated.  Progressing 2/5/20     Long Term Goals: To be accomplished in 8 treatments:   1.  Patient will be familiar with joint protection strategies and adaptive equipment to increase ease of self-care and home care activities Initiated 2/19/20  2. Debbie Starr report improved performance of self and home care using adaptive techniques and equipment as well as orthoses as needed as shown by increase in FOTO of at least 5 points.      PLAN  []  Upgrade activities as tolerated     [x]  Continue plan of care  []  Update interventions per flow sheet       []  Discharge due to:_  []  Other:_      KAMRAN Lundy/REZA 2/19/2020  10:32 AM    Future Appointments   Date Time Provider Tena Dickerson   2/19/2020  3:30 PM Kristie Bai [id number] [facility]   2/20/2020  3:30 PM DO Young Landis Coleman 67   2/26/2020  3:30 PM Norma Roque, OTR/L [id number] [facility]   3/4/2020  3:30 PM Kristie Bai [id number] [facility]   3/13/2020  3:30 PM Kristie Bai [id number] [facility]   3/18/2020  3:30 PM Kristie Bai [id number] [facility]   3/25/2020  3:30 PM Norma Roque, OTR/L [id number]

## 2020-02-24 ENCOUNTER — OFFICE VISIT (OUTPATIENT)
Dept: ORTHOPEDIC SURGERY | Facility: CLINIC | Age: 30
End: 2020-02-24

## 2020-02-24 VITALS
HEART RATE: 76 BPM | DIASTOLIC BLOOD PRESSURE: 80 MMHG | OXYGEN SATURATION: 99 % | TEMPERATURE: 98.1 F | RESPIRATION RATE: 16 BRPM | SYSTOLIC BLOOD PRESSURE: 113 MMHG | HEIGHT: 62 IN | WEIGHT: 143.4 LBS | BODY MASS INDEX: 26.39 KG/M2

## 2020-02-24 DIAGNOSIS — R20.2 NUMBNESS AND TINGLING IN BOTH HANDS: ICD-10-CM

## 2020-02-24 DIAGNOSIS — Q79.60 EHLERS-DANLOS SYNDROME: Primary | ICD-10-CM

## 2020-02-24 DIAGNOSIS — R20.0 NUMBNESS AND TINGLING IN BOTH HANDS: ICD-10-CM

## 2020-02-24 NOTE — PROGRESS NOTES
Darwin Krishnan is a 30 y.o. female right handed unknown employment. Worker's Compensation and legal considerations: not known. Vitals:    02/24/20 1529   BP: 113/80   Pulse: 76   Resp: 16   Temp: 98.1 °F (36.7 °C)   TempSrc: Oral   SpO2: 99%   Weight: 143 lb 6.4 oz (65 kg)   Height: 5' 2\" (1.575 m)   PainSc:   3   PainLoc: Hand           Chief Complaint   Patient presents with    Hand Pain     Markel     HPI: Patient comes in today for follow-up regarding her upper extremity EMGs as well as her OT follow-up after subluxation of some of her finger joints. She was given a splint by OT which she says has significantly helped the subluxation. She reports some continued numbness and tingling and no help with her injections or braces. Initial HPI: Patient comes in today with complaints of bilateral hand pain instability and weakness. She also reports numbness and tingling in both hands. 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 Date of onset: Indeterminate    Injury: No    Prior Treatment:  No    Numbness/ Tingling: Yes: Comment: Bilateral hands    ROS: Review of Systems - General ROS: negative  Respiratory ROS: no cough, shortness of breath, or wheezing  Cardiovascular ROS: no chest pain or dyspnea on exertion  Musculoskeletal ROS: positive for - pain in hand - bilateral  Neurological ROS: positive for - numbness/tingling  Dermatological ROS: negative  Psychological ROS: negative  Allergy and Immunology ROS: negative  Hematological and Lymphatic ROS: negative  Gastrointestinal ROS: no abdominal pain, change in bowel habits, or black or bloody stools    Past Medical History:   Diagnosis Date    Asthma     Chronic headaches        History reviewed. No pertinent surgical history.     Current Outpatient Medications   Medication Sig Dispense Refill    erenumab-aooe (AIMOVIG AUTOINJECTOR) 140 mg/mL injection 140 mg by SubCUTAneous route every thirty (30) days.  sodium chloride 1 gram tablet Take 1 g by mouth three (3) times daily.  docosahexanoic acid/epa (FISH OIL PO) Take  by mouth.  cholecalciferol, vitamin d3, 10,000 unit cap Take  by mouth.  azelastine (ASTEPRO) 0.15 % (205.5 mcg) INSTILL 1 SPRAY IN EACH  NOSTIL BID  3    loratadine (CLARITIN) 10 mg tablet Take 10 mg by mouth.  levalbuterol tartrate (XOPENEX) 45 mcg/actuation inhaler Take  by inhalation.  albuterol (PROVENTIL HFA, VENTOLIN HFA, PROAIR HFA) 90 mcg/actuation inhaler Take  by inhalation.  Lactobacillus acidophilus (PROBIOTIC PO) Take  by mouth.  acetaminophen-caffeine 500-65 mg (EXCEDRINE TENSION HEADACHE) 500-65 mg tab Take 2 Tabs by mouth as needed for Headache.  norethindrone-ethinyl estradiol (JUNEL FE 1/20) 1 mg-20 mcg (21)/75 mg (7) tab Take  by mouth.  levalbuterol HCl (XOPENEX IN) Take  by inhalation.  budesonide (RHINOCORT AQUA) 32 mcg/actuation nasal spray 2 Sprays by Both Nostrils route daily.  cetirizine (ZYRTEC) 10 mg tablet Take  by mouth.  fluticasone (FLONASE ALLERGY RELIEF) 50 mcg/actuation nasal spray 2 Sprays by Both Nostrils route daily.  montelukast (SINGULAIR) 10 mg tablet Take 10 mg by mouth daily.  L-norgest/e.estradiol-e.estrad (ASHLYNA PO) Take  by mouth.  ibuprofen (MOTRIN) 600 mg tablet Take  by mouth every six (6) hours as needed for Pain. Allergies   Allergen Reactions    Joshua-Dm [Chlorpheniramine-Phenyleph-Dm] Anaphylaxis, Itching, Swelling and Other (comments)    Clarithromycin Anaphylaxis         PE:     Bilateral Hands: Again, there is no tenderness to palpation or any gross instability about the joints of both hands. Sensation is intact distally and cap refill is brisk. Patient has full range of motion in all digits. NEUROVASCULAR    Examination L R Examination L R   Carpal Comp. - - Pronator Comp.  - -   Carpal Tinel - - Pronator Tinel - -   Phalen's - - Pronator Stress - -   Cubital Comp. - - Guyon Comp. - -   Cubital Tinel - - Guyon Tinel - -   Elbow Hyperflexion - - Adson's - -   Spurling's - - SC Comp. - -   PCB Median abn - - SC Tinel - -   Radial Tinel - - IC Comp. - -   Digital Tinel - - IC Tinel - -   Radial 2-Pt WNL WNL Ulnar 2-Pt WNL WNL     Radial Pulse: 2+  Capillary Refill: < 2 sec  [employer]: Not Performed  [employer]: Not Performed    NCV & EMG Findings:  All nerve conduction studies (as indicated in the following tables) were within normal limits. All left vs. right side differences were within normal limits.       All examined muscles (as indicated in the following table) showed no evidence of electrical instability.       INTERPRETATION     This was a normal nerve conduction and EMG study showing there to be no signs of neuropathy, myopathy, or radiculopathy in the nerves and muscles tested.      CLINICAL INTERPRETATION  There are no electrodiagnostic findings correlating with her symptoms. Her diffuse paresthesias may be related to sensory hypersensitivity. Imaging: None indicated today        ICD-10-CM ICD-9-CM    1. Simon-Danlos syndrome Q79.60 756.83    2. 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 We will refer the patient to Dr. Darek Eduardo in [facility] who is a specialist in this field.     Follow-up PRN    Plan was reviewed with patient, who verbalized agreement and understanding of the plan

## 2020-02-26 ENCOUNTER — APPOINTMENT (OUTPATIENT)
Dept: PHYSICAL THERAPY | Age: 30
End: 2020-02-26
Payer: COMMERCIAL

## 2020-03-04 ENCOUNTER — HOSPITAL ENCOUNTER (OUTPATIENT)
Dept: PHYSICAL THERAPY | Age: 30
Discharge: HOME | End: 2020-03-04
Payer: COMMERCIAL

## 2020-03-04 ENCOUNTER — HOSPITAL ENCOUNTER (OUTPATIENT)
Age: 30
Discharge: HOME | End: 2020-03-04
Attending: NURSE PRACTITIONER
Payer: COMMERCIAL

## 2020-03-04 DIAGNOSIS — G43.111 INTRACTABLE MIGRAINE WITH AURA WITH STATUS MIGRAINOSUS: ICD-10-CM

## 2020-03-04 PROCEDURE — 97763 ORTHC/PROSTC MGMT SBSQ ENC: CPT

## 2020-03-04 PROCEDURE — 97535 SELF CARE MNGMENT TRAINING: CPT

## 2020-03-04 PROCEDURE — 70551 MRI BRAIN STEM W/O DYE: CPT

## 2020-03-04 NOTE — PROGRESS NOTES
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]    SUBJECTIVE  Pain Level (0-10 scale): 2/10  Any medication changes, allergies to medications, adverse drug reactions, diagnosis change, or new procedure performed?: [x] No    [] Yes (see summary sheet for update)  Subjective functional status/changes:   [] No changes reported  I have new pain in wrist here, thing from use of mouse    OBJECTIVE        15 min Orthotic/Splinting: Othosis options for MPthumb, IP stabilization of digit tips   Rationale: provide support  to improve the patients ability to **tolerate hand use for work task  REviewed use of oval 8s, silver ring and custom options to block thumb motion. Reviewed use of  comfort cool right htumb orthosis to support thumb during daily tasks*    30 min Self Care/Home Management: work modification, lifting modifications   Rationale: joint protection  to improve the patients ability to use mouse without pain at 1st dorsal compartment  Review of lifting techniques for kitchen activities ot reduce pain    With   [] TE   [] TA   [] neuro   [] other: Patient Education: [x] Review HEP    [] Progressed/Changed HEP based on: ingrid calderon.  Orthosis options  [] positioning   [] body mechanics   [] transfers   [] heat/ice application   [] Splint wear/care   [] Sensory re-education   [] scar management      [] other:            Other Objective/Functional Measures: *  Standard Oval 8s too long and block joints not intended  Pain in first dorsal compartment right hand recent onset     Pain Level (0-10 scale) post treatment: 2/10    ASSESSMENT/Changes in Function: Overall improvement in funciton with ongoing concerns not addressed through standard measures    Patient will continue to benefit from skilled OT services to analyze and cue movement patterns and instruct in home and community integration to attain remaining goals. []  See Plan of Care  [x]  See progress note/recertification  []  See Discharge Summary         Progress towards goals / Updated goals:  *1.  Patient will be introduced to joint protection techniques to reduce injury risk to joints from overuse of unstable joints.  Hand out provided, reviewed on this date 2/5/20  2. Patient will be familiar with pain management strategies for use at home Initiated 2/5/20  Status at last visit: met  3.  Patient will be independent in [facility] for strengthening as tolerated. Progressing 2/5/20     Long Term Goals: To be accomplished in 8 treatments:   1.  Patient will be familiar with joint protection strategies and adaptive equipment to increase ease of self-care and home care activities Initiated 2/19/20, revieweed additional 3/4/20  2.  Patient  will report improved performance of self and home care using adaptive techniques and equipment as well as orthoses as needed as shown by increase in FOTO of at least 5 points. Status last visit: Not met 3/4/20    PLAN  []  Upgrade activities as tolerated     []  Continue plan of care  []  Update interventions per flow sheet       []  Discharge due to:_  []  Other: On hold pending research of other options for orthoses_      Melecio Vázquez OTR/L 3/4/2020  3:34 PM    Future Appointments   Date Time Provider Tena Dickerson   3/4/2020  5:30 PM [facility] RM 1 [facility]   3/13/2020  3:30 PM Nichelle Silvestre YBZTWNJ [facility]   3/18/2020  3:30 PM Nichelle Silvestre DNHZDEP [facility]   3/25/2020  3:30 PM Efraín Mena OTR/L MMCPTPB [facility]   3/31/2020  3:45 PM Efraín Mena OTR/REZA MZEMQBM [facility]   4/8/2020  3:30 PM Dakotah Mccray OTR/L MMCPTPB [facility]

## 2020-03-05 NOTE — PROGRESS NOTES
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]  Payor: BLUE CROSS / Plan: [employer] [facility] / Product Type: PPO /  Onset Date:recent diagnosis     Prior Hospitalization: see medical history Provider#: [id number]   Medications: Verified on Patient Summary List    Comorbidities: * Chronic pain, chronic migraines, avulsion fracture right wrist**  Prior Level of Function:*Works on computer all day, I self care home care driving, reading, computer, phone and video games         Visits from Start of Care: 3    Missed Visits: 1  Patient had gap in care between  and 3/4/20    Established Goals:         Excellent           Good         Limited           None  [] Increased ROM   []  []  []  []  [] Increased Strength  []  []  []  []  [] Increased Mobility  []  []  []  []   [] Decreased Pain   []  []  []  []  [] Decreased Swelling  []  []  []  []  [] Increased Fine Motor Skills []  []  []  []  [] Increased ADL [place] []  []  []  []    Key Functional Changes: Improved awareness of joint protection and adaptive techniques and equipment    Prior goals and progress:  *1.  Patient will be introduced to joint protection techniques to reduce injury risk to joints from overuse of unstable joints.    Status at [facility]: Unaware  Current status: Hand out provided, reviewed on this date 20, reports using strategies    2.  Patient will be familiar with pain management strategies for use at home  Status at [facility]: Unaware  Current status: Met    3.  Patient will be independent in [facility] for strengthening as tolerated. Status at eval: Did not have  Current status:  Progressing with putty 2/5/20     Long Term Goals: To be accomplished in 8 treatments:   1.  Patient will be familiar with joint protection strategies and adaptive equipment to increase ease of self-care and home care activities  Status at eval: Unaware  Current status: has purchased equipment but has not started using , progressing    2.  Patient  will report improved performance of self and home care using adaptive techniques and equipment as well as orthoses as needed as shown by increase in FOTO of at least 5 points. Status at eval: FOTO 63   Status last visit: Not met 3/4/20     Updated Goals: to be determined:     ASSESSMENT/RECOMMENDATIONS:    []Continue therapy with the following recommended changes:_____________________      _____________________________________________________________________  []Discontinue therapy progressing towards or have reached established goals  []Discontinue therapy due to lack of appreciable progress towards goals  []Discontinue therapy due to lack of attendance or compliance  []Await Physician's recommendations/decisions regarding therapy  [x]Other:_PATIENT ON HOLD PENDING RESEARCH INTO SMALLER SIZE RING STYLE [facility]. STANDARD SIZE DO NOT FIT PROPERLY._______________________________________________________________    Thank you for this referral.   SIMON Funk/REZA 3/5/2020 3:55 PM  NOTE TO PHYSICIAN:  PLEASE COMPLETE THE ORDERS BELOW AND   FAX TO [facility]: [phone]  If you are unable to process this request in 24 hours please contact our office: [phone]    ? I have read the above report and request that my patient continue as recommended. ? I have read the above report and request that my patient continue therapy with the following changes/special instructions:________________________________________________________  ?  I have read the above report and request that my patient be discharged from therapy.     [de-identified] Signature:____________Date:_________TIME:________    [facility], Date and Time must be completed for valid certification **

## 2020-03-13 ENCOUNTER — APPOINTMENT (OUTPATIENT)
Dept: PHYSICAL THERAPY | Age: 30
End: 2020-03-13
Payer: COMMERCIAL

## 2020-03-18 ENCOUNTER — APPOINTMENT (OUTPATIENT)
Dept: PHYSICAL THERAPY | Age: 30
End: 2020-03-18
Payer: COMMERCIAL

## 2020-03-25 ENCOUNTER — APPOINTMENT (OUTPATIENT)
Dept: PHYSICAL THERAPY | Age: 30
End: 2020-03-25
Payer: COMMERCIAL

## 2020-04-28 NOTE — PROGRESS NOTES
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]  Payor: BLUE CROSS / Plan: [facility] / Product Type: PPO /  Onset Date:REcent diagnosis     Prior Hospitalization: see medical history Provider#: [id number]   Medications: Verified on Patient Summary List    Comorbidities: Chronic p, ain, avulsion fracture right wrist, migraines  Prior Level of Function:works on computer all day, 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 She was placed on telephone follow up due to COVID-19, then requested discharge. *1.  Patient will be introduced to joint protection techniques to reduce injury risk to joints from overuse of unstable joints.    Status at eval: Unaware  Discharge  status: Hand out provided, reviewed  reports using strategies, Met     2. Patient will be familiar with pain management strategies for use at home  Status at eval: Unaware  Current status: Met     3.  Patient will be independent in [facility] for strengthening as tolerated.   Status at eval: Did not have  Discharge  status:  Met     Long Term Goals: To be accomplished in 8 treatments:   1.  Patient will be familiar with joint protection strategies and adaptive equipment to increase ease of self-care and home care activities  Status at [facility]: Unaware  Discharge  status: has purchased equipment but has not started using , progressing     2.  Patient  will report improved performance of self and home care using adaptive techniques and equipment as well as orthoses as needed as shown by increase in FOTO of at least 5 points. Status at eval: FOTO 63   Status last visit: Not met 3/4/20    ASSESSMENT/Changes in Function: Unable to further assess due to COVID-19 restrictions. Pt declined telehealth services and will be discharged from outpatient therapy at this time. Trial of commercially available Oval 8 orthoses was not successful due to small hand size with enlarged joints.       ASSESSMENT/RECOMMENDATIONS:  [x]Discontinue therapy: []Patient has reached or is progressing toward set goals      []Patient is non-compliant or has abdicated      []Due to lack of appreciable progress towards set goals      X Self discharge due to [address] [facility] [address], OTR/L 4/28/2020 9:09 AM

## 2021-08-04 ENCOUNTER — OFFICE VISIT (OUTPATIENT)
Dept: ORTHOPEDIC SURGERY | Age: 31
End: 2021-08-04
Payer: COMMERCIAL

## 2021-08-04 VITALS
OXYGEN SATURATION: 99 % | RESPIRATION RATE: 15 BRPM | HEART RATE: 109 BPM | HEIGHT: 62 IN | WEIGHT: 136 LBS | BODY MASS INDEX: 25.03 KG/M2

## 2021-08-04 DIAGNOSIS — Q79.60 EHLERS-DANLOS SYNDROME: Primary | ICD-10-CM

## 2021-08-04 DIAGNOSIS — M79.671 RIGHT FOOT PAIN: ICD-10-CM

## 2021-08-04 DIAGNOSIS — M24.20 LAXITY, LIGAMENT: ICD-10-CM

## 2021-08-04 PROCEDURE — 73630 X-RAY EXAM OF FOOT: CPT | Performed by: ORTHOPAEDIC SURGERY

## 2021-08-04 PROCEDURE — 99214 OFFICE O/P EST MOD 30 MIN: CPT | Performed by: ORTHOPAEDIC SURGERY

## 2021-08-04 PROCEDURE — 29550 STRAPPING OF TOES: CPT | Performed by: ORTHOPAEDIC SURGERY

## 2021-08-04 NOTE — PROGRESS NOTES
AMBULATORY PROGRESS NOTE      Patient: Amy Hayes             MRN: [id number]     SSN: [id number] Body mass index is 24.87 kg/m². YOB: 1990     AGE: 33 y.o. EX: female    PCP: Domo Flowers MD       IMPRESSION //  DIAGNOSIS AND TREATMENT PLAN        Amy Hayes has a diagnosis of:      Hyperlaxity syndrome, right fifth toe PIP: Recommendations telly taping for 4 to 6 weeks. No provocative stretching of the toe was recommended. She showed me digital photographs of her right fifth toe, from yesterday, that shows that when she is holding it and placing a valgus stress, there is significant valgus alignment of this right fifth toe at the PIP region. X-rays 3 views, right foot, today, shows normal alignment without fracture subluxation no dislocation no osteolytic or osteoblastic lesions are seen on these 3 views, right foot. DIAGNOSES    1. Simon-Danlos syndrome    2. Laxity, ligament    3. Right foot pain        Orders Placed This Encounter    [04724] Foot Min 3V     Order Specific Question:   Weight bearing? Answer:   No        PLAN:    1. Coban taping of fourth and fifth toes // YES taping of the 4/5 toes were done in the office. RTO-  6 weeks. Amy Hayes  expresses understanding of the diagnosis, treatment plan, and all of their proposed questions were answered to their satisfaction. Patient education has been provided re the diagnoses. HPI //  109 [facility] Yvan IS A 33 y.o. female who is a/an  new patient, presenting to my outpatient office for evaluation of  the following chief complaint(s):     Chief Complaint   Patient presents with    Toe Pain     right little toe       Patient presents today w/  right little toe pain onset couple days ago. She describes the pain as achy. Patient presents a photo of her little toe abnormally abducting that was taken on 8/3/2021.   She describes, placing a stress on her right fifth toe, in order to try to make it feel better as is a little achy. But cannot recall any trauma to the toe. She is demonstrated to show me a photograph of her right fifth toe and right foot, foot she placed a valgus stress test on this toe causing her to going to significant mount of valgus at the PIP region. Dr. Guille Felix referred to  who refused to conduct genetics tests. Zoe Campo diagnosed her w/ hypermobility syndrome/EDS. Visit Vitals  Pulse (!) 109   Resp 15   Ht 5' 2\" (1.575 m)   Wt 136 lb (61.7 kg)   SpO2 99%   BMI 24.87 kg/m²       Appearance: Alert, well appearing and pleasant patient who is in no distress, oriented to person, place/time, and who follows commands. This patient is accompanied in the examination room by her  self. There is signs of: no dementia  Psychiatric: Affect/mood are appropriate. Speech normal in context and clarity, memory intact grossly, no involuntary movements - tremors. Patient arrives to office via: without assistive device:   H EENT (2): Head normocephalic & atraumatic. Eye: pupils are round// EOM are intact // Neck: ROM WNL  // Hearings Intact   Respiratory: Breathing non labored     ANKLE/FOOT right    Gait: normal  Tenderness: no     Cutaneous:   WNL. Joint Motion:   WNL  Joint / Tendon Stability: No Ankle or Subtalar instability or joint laxity. No peroneal sublux ability or dislocation  Alignment: neutral Hindfoot,    Neuro Motor/Sensory: NL/NL  Vascular: NL foot/ankle pulses,   Lymphatics: No extremity lymphedema, No calf swelling, no tenderness to calf muscles. CHART REVIEW     Savannah Reveles has been experiencing pain and discomfort confirmed as outlined in the pain assessment outlined below.  was reviewed by Jerry Randolph MD on 8/4/2021.      Pain Assessment  8/4/2021   Location of Pain Toe   Pain Location Comment -   Location Modifiers Right   Severity of Pain 1 Quality of Pain Dull   Quality of Pain Comment -   Duration of Pain -   Frequency of Pain -   Aggravating Factors Walking   Aggravating Factors Comment -   Limiting Behavior Some   Relieving Factors Rest   Relieving Factors Comment -   Result of Injury No   Work-Related Injury -   Type of Injury -   Type of Injury Comment -        Kirby Randolph  has a past medical history of Asthma and Chronic headaches. Patients is employed at:         Past Medical History:   Diagnosis Date    Asthma     Chronic headaches      History reviewed. No pertinent surgical history. Current Outpatient Medications   Medication Sig    erenumab-aooe (AIMOVIG AUTOINJECTOR) 140 mg/mL injection 140 mg by SubCUTAneous route every thirty (30) days.  norethindrone-ethinyl estradiol (JUNEL FE 1/20) 1 mg-20 mcg (21)/75 mg (7) tab Take  by mouth.  levalbuterol HCl (XOPENEX IN) Take  by inhalation.  sodium chloride 1 gram tablet Take 1 g by mouth three (3) times daily.  docosahexanoic acid/epa (FISH OIL PO) Take  by mouth.  cholecalciferol, vitamin d3, 10,000 unit cap Take  by mouth.  azelastine (ASTEPRO) 0.15 % (205.5 mcg) INSTILL 1 SPRAY IN EACH  NOSTIL BID    budesonide (RHINOCORT AQUA) 32 mcg/actuation nasal spray 2 Sprays by Both Nostrils route daily.  cetirizine (ZYRTEC) 10 mg tablet Take  by mouth.  fluticasone (FLONASE ALLERGY RELIEF) 50 mcg/actuation nasal spray 2 Sprays by Both Nostrils route daily.  montelukast (SINGULAIR) 10 mg tablet Take 10 mg by mouth daily.  loratadine (CLARITIN) 10 mg tablet Take 10 mg by mouth.  L-norgest/e.estradiol-e.estrad (ASHLYNA PO) Take  by mouth.  levalbuterol tartrate (XOPENEX) 45 mcg/actuation inhaler Take  by inhalation.  albuterol (PROVENTIL HFA, VENTOLIN HFA, PROAIR HFA) 90 mcg/actuation inhaler Take  by inhalation.  Lactobacillus acidophilus (PROBIOTIC PO) Take  by mouth.     ibuprofen (MOTRIN) 600 mg tablet Take  by mouth every six (6) hours as needed for Pain.  acetaminophen-caffeine 500-65 mg (EXCEDRINE TENSION HEADACHE) 500-65 mg tab Take 2 Tabs by mouth as needed for Headache. No current facility-administered medications for this visit. Allergies   Allergen Reactions    Joshua-Dm [Chlorpheniramine-Phenyleph-Dm] Anaphylaxis, Itching, Swelling and Other (comments)    Clarithromycin Anaphylaxis     Social History     Occupational History    Not on file   Tobacco Use    Smoking status: Never Smoker    Smokeless tobacco: Never Used   Substance and Sexual Activity    Alcohol use: No    Drug use: Never    Sexual activity: Not on file     History reviewed. No pertinent family history. DIAGNOSTIC LAB DATA      No results found for: HBA1C, ZVZ6LDTS, GRG5DEIJ // No results found for: GLU, GLUCPOC     No results found for: MVT3KFYP, DYO7PTIP      No results found for: VITD3, XQVID2, XQVID3, XQVID, VD3RIA, SPEJ34CUVTJ      REVIEW OF SYSTEMS : 8/4/2021  ALL BELOW ARE Negative except : SEE HPI     All other systems reviewed and are negative. 12 point review of systems otherwise negative unless noted in HPI. DIAGNOSTIC IMAGING /ORDERS       Orders Placed This Encounter    [95570] Foot Min 3V     Order Specific Question:   Weight bearing? Answer:   No         FOOT X RAYS 3 VIEWS Right   8/4/2021    NON WEIGHT BEARING    X RAYS AT [facility] [address] [place] [address]  8/4/2021      Bones: No fractures or dislocations. No focal osteolytic or osteoblastic process     Bone Spurs: No significant bone spurs  Foot Alignment: WNL  Joint Condition: No Significant OA  Soft Tissues: Normal, No radiopaque foreign body and No abnormal calcific densities to soft tissues   No ankle joint effusion in lateral projection.   Mineralization: Suggests  no Osteopenia    I have personally reviewed the results of the above study and the interpretation of this study is my professional opinion                 On this date 08/04/2021 I have spent 30 minutes reviewing previous notes, test results and face to face with the patient discussing the diagnosis and importance of compliance with the treatment plan as well as documenting on the day of the visit. An electronic signature was used to authenticate this note. Disclaimer: Sections of this note are dictated using utilizing voice recognition software, which may have resulted in some phonetic based errors in grammar and contents. Even though attempts were made to correct all the mistakes, some may have been missed, and remained in the body of the document. If questions arise, please contact our department. Jessica Kong may have a reminder for a \"due or due soon\" health maintenance. I have asked that she contact her primary care provider for follow-up on this health maintenance.     Neris Medrano, as dictated by  Alessandro Graves MD  8/4/2021  3:37 PM

## 2021-08-27 ENCOUNTER — TELEPHONE (OUTPATIENT)
Dept: ORTHOPEDIC SURGERY | Age: 31
End: 2021-08-27

## 2021-08-27 NOTE — TELEPHONE ENCOUNTER
Patient can continue telly taping for comfort until her follow up appointment on 9/15/21. Cassandra Moffett [facility], MPA, PA-C  8/27/2021  12:20 PM

## 2021-08-27 NOTE — TELEPHONE ENCOUNTER
Patient called to give an update on her plan of care. She stated she was advised to stop taping her toes 2 weeks before her follow up appointment, however she is still in as much pain as she was when she initially came to the office. She stated she did not know if physician wanted her to keep taping her toes, or if there was something else he would recommend. Please advise patient at [phone].

## 2021-09-14 NOTE — PROGRESS NOTES
AMBULATORY PROGRESS NOTE      Patient: Samaria Faulkner             MRN: [id number]     SSN: [id number] Body mass index is 23.78 kg/m². YOB: 1990     AGE: 33 y.o. EX: female    PCP: Kylie Hicks MD       IMPRESSION //  DIAGNOSIS AND TREATMENT PLAN        Samaria Faulkner has a diagnosis of:      Right 5th toe PIP laxity to valgus stress. Samaria Faulkner agrees to continue conservative care at this current time. DIAGNOSES    1. Simon-Danlos syndrome    2. Laxity, ligament        No orders of the defined types were placed in this encounter. PLAN:    1. Continue buddy taping as needed      RTO-  3 months    Samaria Faulkner  expresses understanding of the diagnosis, treatment plan, and all of their proposed questions were answered to their satisfaction. Patient education has been provided re the diagnoses. She states that her right fifth toe, is doing better, as relates to the laxity, at the fifth toe PIP region. As such, will continue not incisional surgery, continue conservative care, for her. HPI //  109 [facility] Yvan IS A 33 y.o. female who is a/an  established patient, presenting to my outpatient office for evaluation of  the following chief complaint(s):     Chief Complaint   Patient presents with    Toe Pain     right pinky toe       At LOV pt presented w/ right foot pain. Obtained 3-View XR of right foot. Coban taping of fourth and fifth toes // YES taping of the 4/5 toes were done in the office. Since LOV pt continues to endorse right 5th toe pain. She mentions her toe doesn't sublux as much. Mentions she has occasional ashiness whether she is active throughout the day or not. H/o EDS. she states that her right fifth toe, is doing better, as relates to the laxity, at the fifth toe PIP region. As such, will continue not incisional surgery, continue conservative care, for her.     Visit Vitals  Pulse 96   Temp 98.6 °F (37 °C) (Temporal)   Ht 5' 2\" (1.575 m)   Wt 130 lb (59 kg)   SpO2 98%   BMI 23.78 kg/m²       Appearance: Alert, well appearing and pleasant patient who is in no distress, oriented to person, place/time, and who follows commands. This patient is accompanied in the examination room by her  self. There is signs of: no dementia  Psychiatric: Affect/mood are appropriate. Speech normal in context and clarity, memory intact grossly, no involuntary movements - tremors. Patient arrives to office via: without assistive device:   H EENT (2): Head normocephalic & atraumatic. Eye: pupils are round// EOM are intact // Neck: ROM WNL  // Hearings Intact   Respiratory: Breathing non labored     ANKLE/FOOT right    Gait: normal  Tenderness: mild over  fifth toe   Cutaneous:   WNL. Joint Motion:   WNL  Joint / Tendon Stability: No Ankle or Subtalar instability or joint laxity. No peroneal sublux ability or dislocation  Alignment: neutral Hindfoot, there are some laxity, at the right fifth toe PIP region, to valgus stress  Neuro Motor/Sensory: NL/NL  Vascular: NL foot/ankle pulses,   Lymphatics: No extremity lymphedema, No calf swelling, no tenderness to calf muscles. CHART REVIEW     Saloni Vogt has been experiencing pain and discomfort confirmed as outlined in the pain assessment outlined below.  was reviewed by Blanca Macias MD on 9/15/2021.      Pain Assessment  9/15/2021   Location of Pain Toe   Pain Location Comment -   Location Modifiers Right   Severity of Pain 0   Quality of Pain -   Quality of Pain Comment -   Duration of Pain -   Frequency of Pain -   Aggravating Factors -   Aggravating Factors Comment -   Limiting Behavior -   Relieving Factors -   Relieving Factors Comment -   Result of Injury -   Work-Related Injury -   Type of Injury -   Type of Injury Comment -        Saloni Vogt  has a past medical history of Asthma and Chronic headaches. Patients is employed at:         Past Medical History:   Diagnosis Date    Asthma     Chronic headaches      History reviewed. No pertinent surgical history. Current Outpatient Medications   Medication Sig    erenumab-aooe (AIMOVIG AUTOINJECTOR) 140 mg/mL injection 140 mg by SubCUTAneous route every thirty (30) days.  norethindrone-ethinyl estradiol (JUNEL FE 1/20) 1 mg-20 mcg (21)/75 mg (7) tab Take  by mouth.  levalbuterol HCl (XOPENEX IN) Take  by inhalation.  sodium chloride 1 gram tablet Take 1 g by mouth three (3) times daily.  docosahexanoic acid/epa (FISH OIL PO) Take  by mouth.  cholecalciferol, vitamin d3, 10,000 unit cap Take  by mouth.  azelastine (ASTEPRO) 0.15 % (205.5 mcg) INSTILL 1 SPRAY IN EACH  NOSTIL BID    budesonide (RHINOCORT AQUA) 32 mcg/actuation nasal spray 2 Sprays by Both Nostrils route daily.  cetirizine (ZYRTEC) 10 mg tablet Take  by mouth.  fluticasone (FLONASE ALLERGY RELIEF) 50 mcg/actuation nasal spray 2 Sprays by Both Nostrils route daily.  montelukast (SINGULAIR) 10 mg tablet Take 10 mg by mouth daily.  loratadine (CLARITIN) 10 mg tablet Take 10 mg by mouth.  L-norgest/e.estradiol-e.estrad (ASHLYNA PO) Take  by mouth.  levalbuterol tartrate (XOPENEX) 45 mcg/actuation inhaler Take  by inhalation.  albuterol (PROVENTIL HFA, VENTOLIN HFA, PROAIR HFA) 90 mcg/actuation inhaler Take  by inhalation.  Lactobacillus acidophilus (PROBIOTIC PO) Take  by mouth.  ibuprofen (MOTRIN) 600 mg tablet Take  by mouth every six (6) hours as needed for Pain.  acetaminophen-caffeine 500-65 mg (EXCEDRINE TENSION HEADACHE) 500-65 mg tab Take 2 Tabs by mouth as needed for Headache. No current facility-administered medications for this visit.      Allergies   Allergen Reactions    Joshua-Dm [Chlorpheniramine-Phenyleph-Dm] Anaphylaxis, Itching, Swelling and Other (comments)    Clarithromycin Anaphylaxis     Social History     Occupational History    Not on file   Tobacco Use    Smoking status: Never Smoker    Smokeless tobacco: Never Used   Substance and Sexual Activity    Alcohol use: No    Drug use: Never    Sexual activity: Not on file     History reviewed. No pertinent family history. DIAGNOSTIC LAB DATA      No results found for: HBA1C, KYF6TPDI, PRM3XGEX // No results found for: GLU, GLUCPOC     No results found for: SJH7RNEV, RCR9DZMH      No results found for: VITD3, XQVID2, XQVID3, XQVID, VD3RIA, SYUE88FVXML      REVIEW OF SYSTEMS : 9/15/2021  ALL BELOW ARE Negative except : SEE HPI     All other systems reviewed and are negative. 12 point review of systems otherwise negative unless noted in HPI. DIAGNOSTIC IMAGING /ORDERS       No orders of the defined types were placed in this encounter. No X ray's were obtained today        I have reviewed the results of the above study. The interpretation of this study is my professional opinion. On this date 09/15/2021 I have spent 25 minutes reviewing previous notes, test results and face to face with the patient discussing the diagnosis and importance of compliance with the treatment plan as well as documenting on the day of the visit. An electronic signature was used to authenticate this note. Disclaimer: Sections of this note are dictated using utilizing voice recognition software, which may have resulted in some phonetic based errors in grammar and contents. Even though attempts were made to correct all the mistakes, some may have been missed, and remained in the body of the document. If questions arise, please contact our department. Sasha Bain may have a reminder for a \"due or due soon\" health maintenance. I have asked that she contact her primary care provider for follow-up on this health maintenance.     Russ Justice, as dictated byOliver  9/15/2021  1:53 PM

## 2021-09-15 ENCOUNTER — OFFICE VISIT (OUTPATIENT)
Dept: ORTHOPEDIC SURGERY | Age: 31
End: 2021-09-15
Payer: COMMERCIAL

## 2021-09-15 VITALS
HEIGHT: 62 IN | OXYGEN SATURATION: 98 % | TEMPERATURE: 98.6 F | WEIGHT: 130 LBS | HEART RATE: 96 BPM | BODY MASS INDEX: 23.92 KG/M2

## 2021-09-15 DIAGNOSIS — Q79.60 EHLERS-DANLOS SYNDROME: Primary | ICD-10-CM

## 2021-09-15 DIAGNOSIS — M24.20 LAXITY, LIGAMENT: ICD-10-CM

## 2021-09-15 PROCEDURE — 99213 OFFICE O/P EST LOW 20 MIN: CPT | Performed by: ORTHOPAEDIC SURGERY

## 2022-04-18 ENCOUNTER — TELEPHONE (OUTPATIENT)
Dept: ORTHOPEDIC SURGERY | Age: 32
End: 2022-04-18

## 2022-04-18 NOTE — TELEPHONE ENCOUNTER
Patient was last seen on 02/24/20 for bilateral hand pain. She believes she has \"re-injured\" her thumb. She has been splinting and icing, but would like to be seen. I offered first available at [facility], which is 05/09/22. She feels she needs to be seen sooner. Please advise if she can be fit in.      Patient [phone]

## 2022-04-28 NOTE — TELEPHONE ENCOUNTER
Patient was called to reschedule 4/28/22 due to provider out of office . Per Provider pt to rs the 4/28/22 appt. Patient rs for the earliest available date of 05/13/22 at [facility]. Patient is asking if their is any way that she could be worked in on a sooner date at either ,  or [facility]. Patient tel. [phone].

## 2022-05-02 ENCOUNTER — OFFICE VISIT (OUTPATIENT)
Dept: ORTHOPEDIC SURGERY | Age: 32
End: 2022-05-02
Payer: COMMERCIAL

## 2022-05-02 VITALS — HEART RATE: 100 BPM | OXYGEN SATURATION: 98 % | WEIGHT: 130 LBS | HEIGHT: 62 IN | BODY MASS INDEX: 23.92 KG/M2

## 2022-05-02 DIAGNOSIS — M79.641 RIGHT HAND PAIN: ICD-10-CM

## 2022-05-02 DIAGNOSIS — S63.641A SPRAIN OF METACARPOPHALANGEAL JOINT OF RIGHT THUMB, INITIAL ENCOUNTER: Primary | ICD-10-CM

## 2022-05-02 PROCEDURE — 99214 OFFICE O/P EST MOD 30 MIN: CPT | Performed by: ORTHOPAEDIC SURGERY

## 2022-05-02 PROCEDURE — 73130 X-RAY EXAM OF HAND: CPT | Performed by: ORTHOPAEDIC SURGERY

## 2022-05-02 NOTE — PROGRESS NOTES
Savannah Reveles is a 33 y.o. female right handed unknown employment. Worker's Compensation and legal considerations: not known. Vitals:    05/02/22 0801   Pulse: 100   SpO2: 98%   Weight: 130 lb (59 kg)   Height: 5' 2\" (1.575 m)   PainSc:   7   PainLoc: Hand           Chief Complaint   Patient presents with    Hand Pain     right hand     HPI: Patient presents today with a new problem of a possible injury to her right thumb and hand. She says she went to grab something and felt pain shortly after. She reports a ripping feeling when moving her thumb. Also we have previously referred her to an EDS specialist in [place] just before the pandemic. She says she was not able to go due to the pandemic and would like to hold off at this time. 2/24/2020 HPI: Patient comes in today for follow-up regarding her upper extremity EMGs as well as her OT follow-up after subluxation of some of her finger joints. She was given a splint by OT which she says has significantly helped the subluxation. She reports some continued numbness and tingling and no help with her injections or braces. Initial HPI: Patient comes in today with complaints of bilateral hand pain instability and weakness. She also reports numbness and tingling in both hands. 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     Date of onset: Mid April 2022    Injury: No    Prior Treatment:  No    Numbness/ Tingling: No    ROS: Review of Systems - General ROS: negative  Respiratory ROS: no cough, shortness of breath, or wheezing  Cardiovascular ROS: no chest pain or dyspnea on exertion  Musculoskeletal ROS: positive for - pain in hand - bilateral  Neurological ROS: Negative  Dermatological ROS: negative  Psychological ROS: negative  Allergy and Immunology ROS: negative  Hematological and Lymphatic ROS: negative  Gastrointestinal ROS: no abdominal pain, change in bowel habits, or black or bloody stools    Past Medical History:   Diagnosis Date    Asthma     Chronic headaches        History reviewed. No pertinent surgical history. Current Outpatient Medications   Medication Sig Dispense Refill    erenumab-aooe (AIMOVIG AUTOINJECTOR) 140 mg/mL injection 140 mg by SubCUTAneous route every thirty (30) days.  norethindrone-ethinyl estradiol (JUNEL FE 1/20) 1 mg-20 mcg (21)/75 mg (7) tab Take  by mouth.  levalbuterol HCl (XOPENEX IN) Take  by inhalation.  sodium chloride 1 gram tablet Take 1 g by mouth three (3) times daily.  docosahexanoic acid/epa (FISH OIL PO) Take  by mouth.  cholecalciferol, vitamin d3, 10,000 unit cap Take  by mouth.  azelastine (ASTEPRO) 0.15 % (205.5 mcg) INSTILL 1 SPRAY IN EACH  NOSTIL BID  3    budesonide (RHINOCORT AQUA) 32 mcg/actuation nasal spray 2 Sprays by Both Nostrils route daily.  cetirizine (ZYRTEC) 10 mg tablet Take  by mouth.  fluticasone (FLONASE ALLERGY RELIEF) 50 mcg/actuation nasal spray 2 Sprays by Both Nostrils route daily.  montelukast (SINGULAIR) 10 mg tablet Take 10 mg by mouth daily.  loratadine (CLARITIN) 10 mg tablet Take 10 mg by mouth.  L-norgest/e.estradiol-e.estrad (ASHLYNA PO) Take  by mouth.  levalbuterol tartrate (XOPENEX) 45 mcg/actuation inhaler Take  by inhalation.  albuterol (PROVENTIL HFA, VENTOLIN HFA, PROAIR HFA) 90 mcg/actuation inhaler Take  by inhalation.  Lactobacillus acidophilus (PROBIOTIC PO) Take  by mouth.  ibuprofen (MOTRIN) 600 mg tablet Take  by mouth every six (6) hours as needed for Pain.  acetaminophen-caffeine 500-65 mg (EXCEDRINE TENSION HEADACHE) 500-65 mg tab Take 2 Tabs by mouth as needed for Headache.          Allergies   Allergen Reactions    Joshua-Dm [Chlorpheniramine-Phenyleph-Dm] Anaphylaxis, Itching, Swelling and Other (comments)    Clarithromycin Anaphylaxis         PE:     Physical Exam  Vitals and nursing note reviewed. Constitutional:       General: She is not in acute distress. Appearance: Normal appearance. She is not ill-appearing. Cardiovascular:      Pulses: Normal pulses. Pulmonary:      Effort: Pulmonary effort is normal. No respiratory distress. Musculoskeletal:         General: Tenderness present. No swelling, deformity or signs of injury. Normal range of motion. Cervical back: Normal range of motion and neck supple. Right lower leg: No edema. Left lower leg: No edema. Skin:     General: Skin is warm and dry. Capillary Refill: Capillary refill takes less than 2 seconds. Findings: No bruising or erythema. Neurological:      General: No focal deficit present. Mental Status: She is alert and oriented to person, place, and time. Psychiatric:         Mood and Affect: Mood normal.         Behavior: Behavior normal.         Right thumb: There is no laxity noted about the MCP or IP joint of the thumb. There is a good endpoint about the ulnar collateral and radial collateral ligaments. Neurovascularly intact distally. Range of motion near full. Mild tenderness noted about the UCL. NCV & EMG Findings:  All nerve conduction studies (as indicated in the following tables) were within normal limits. All left vs. right side differences were within normal limits.       All examined muscles (as indicated in the following table) showed no evidence of electrical instability.       INTERPRETATION     This was a normal nerve conduction and EMG study showing there to be no signs of neuropathy, myopathy, or radiculopathy in the nerves and muscles tested.      CLINICAL INTERPRETATION  There are no electrodiagnostic findings correlating with her symptoms. Her diffuse paresthesias may be related to sensory hypersensitivity. Imaging: None indicated today        ICD-10-CM ICD-9-CM    1.  Sprain of metacarpophalangeal joint of right thumb, initial encounter  [phone]    2. Right hand pain  M79.641 729.5 AMB POC XRAY, HAND; 3+ VIEWS       Plan: At this time as the patient does not have any electrodiagnostic findings and questionable physical exam findings of a peripheral mononeuropathy, and that her most likely cause of her issues is her ligamentous laxity due to ehrlos Danlos syndrome. We will refer the patient to Dr. Killian Rodríguez in [facility] who is a specialist in this field. Follow-up and Dispositions    · Return if symptoms worsen or fail to improve.          Plan was reviewed with patient, who verbalized agreement and understanding of the plan

## 2024-09-23 ENCOUNTER — OFFICE VISIT (OUTPATIENT)
Age: 34
End: 2024-09-23
Payer: COMMERCIAL

## 2024-09-23 VITALS
TEMPERATURE: 98 F | OXYGEN SATURATION: 89 % | WEIGHT: 169 LBS | BODY MASS INDEX: 31.1 KG/M2 | HEART RATE: 100 BPM | HEIGHT: 62 IN

## 2024-09-23 DIAGNOSIS — M54.2 NECK PAIN: Primary | ICD-10-CM

## 2024-09-23 DIAGNOSIS — M54.12 CERVICAL RADICULOPATHY: ICD-10-CM

## 2024-09-23 PROCEDURE — 72040 X-RAY EXAM NECK SPINE 2-3 VW: CPT | Performed by: NURSE PRACTITIONER

## 2024-09-23 PROCEDURE — 99203 OFFICE O/P NEW LOW 30 MIN: CPT | Performed by: NURSE PRACTITIONER

## 2024-09-23 RX ORDER — HYDROXYZINE HYDROCHLORIDE 10 MG/1
10 TABLET, FILM COATED ORAL EVERY 4 HOURS PRN
COMMUNITY

## 2024-12-09 ENCOUNTER — SCHEDULED TELEPHONE ENCOUNTER (OUTPATIENT)
Age: 34
End: 2024-12-09
Payer: COMMERCIAL

## 2024-12-09 DIAGNOSIS — M54.2 NECK PAIN: Primary | ICD-10-CM

## 2024-12-09 DIAGNOSIS — M54.12 CERVICAL RADICULOPATHY: ICD-10-CM

## 2024-12-09 DIAGNOSIS — G44.86 CERVICOGENIC HEADACHE: ICD-10-CM

## 2024-12-09 PROCEDURE — 99443 PR PHYS/QHP TELEPHONE EVALUATION 21-30 MIN: CPT | Performed by: NURSE PRACTITIONER

## 2024-12-09 RX ORDER — ONDANSETRON 4 MG/1
4 TABLET, FILM COATED ORAL 3 TIMES DAILY PRN
COMMUNITY
Start: 2024-11-07

## 2024-12-09 RX ORDER — LEVONORGESTREL AND ETHINYL ESTRADIOL 100-20(84)
1 KIT ORAL DAILY
COMMUNITY
Start: 2024-11-21

## 2024-12-09 NOTE — PROGRESS NOTES
Lilian Medina is a 34 y.o. female evaluated via telephone on 12/9/2024 for Neck Pain  .      History of Present Illness: The patient is a 34-year-old female who I saw on September 23, 2024.  She has neck pain that radiates to the right subscapular area and between her shoulder blades and also has headaches for the past 17 years.  She undergoes Botox treatments for those.  They do not really seem to help and she was thinking the headaches might be stemming from her neck.  We did a cervical x-ray at that visit it demonstrates some cervical straightening.  I went ahead and printed off a home exercise program for her to do for her neck which she did do consistently over the last few months.  She states the stretching part helped a little bit.  Overall though she continues with same symptoms.  She is also tried ibuprofen and Excedrin Migraine without any relief.  She denies fever bowel bladder dysfunction    Physical Exam:.  The patient is a 34-year-old female who is alert and oriented.  Spoke fluency.  She did not appear to be in distress.      Assessment/Plan: This is a patient who has neck pain with cervical radiculopathy and headaches.  She has done the physical therapy and the conservative treatment.  I will order an MRI of the cervical spine.  She will follow-up with one of our physicians for MRI follow-up.    Lilian was seen today for neck pain.    Diagnoses and all orders for this visit:    Neck pain  -     MRI CERVICAL SPINE WO CONTRAST; Future    Cervical radiculopathy  -     MRI CERVICAL SPINE WO CONTRAST; Future    Cervicogenic headache  -     MRI CERVICAL SPINE WO CONTRAST; Future          Documentation:  I communicated with the patient and/or health care decision maker about neck, headaches and radicular pain.   Details of this discussion including any medical advice provided: Yes    Total Time: minutes: 21-30 minutes    Lilian Medina was evaluated through a synchronous (real-time) audio

## 2025-01-17 ENCOUNTER — HOSPITAL ENCOUNTER (OUTPATIENT)
Facility: HOSPITAL | Age: 35
Discharge: HOME | End: 2025-01-20
Payer: COMMERCIAL

## 2025-01-17 DIAGNOSIS — M54.2 NECK PAIN: ICD-10-CM

## 2025-01-17 DIAGNOSIS — G44.86 CERVICOGENIC HEADACHE: ICD-10-CM

## 2025-01-17 DIAGNOSIS — M54.12 CERVICAL RADICULOPATHY: ICD-10-CM

## 2025-01-17 PROCEDURE — 72141 MRI NECK SPINE W/O DYE: CPT

## 2025-01-26 NOTE — TELEPHONE ENCOUNTER
2:40 on 4/27 at [facility] or 3:40 on 4/28 at [facility] (moderate pain)

## 2025-02-18 ENCOUNTER — TELEPHONE (OUTPATIENT)
Age: 35
End: 2025-02-18

## 2025-02-18 NOTE — TELEPHONE ENCOUNTER
Patient is requesting a call back because the patient wants to know if her 2/19/2025 appt can be virtual visit because the appt is to discuss MRI results.    Patient tel [phone]

## 2025-02-18 NOTE — TELEPHONE ENCOUNTER
Called patient and used two identifiers, advised per Dr Burch an MRI review must be in person. Patient was rescheduled as requested due to the impending inclement weather.

## 2025-03-04 ENCOUNTER — OFFICE VISIT (OUTPATIENT)
Age: 35
End: 2025-03-04
Payer: COMMERCIAL

## 2025-03-04 VITALS — WEIGHT: 164 LBS | BODY MASS INDEX: 30.18 KG/M2 | HEIGHT: 62 IN | TEMPERATURE: 98 F

## 2025-03-04 DIAGNOSIS — R51.9 CHRONIC NONINTRACTABLE HEADACHE, UNSPECIFIED HEADACHE TYPE: Primary | ICD-10-CM

## 2025-03-04 DIAGNOSIS — G89.29 CHRONIC NONINTRACTABLE HEADACHE, UNSPECIFIED HEADACHE TYPE: Primary | ICD-10-CM

## 2025-03-04 DIAGNOSIS — G93.5 CHIARI I MALFORMATION (HCC): ICD-10-CM

## 2025-03-04 DIAGNOSIS — Q79.60 EHLERS-DANLOS SYNDROME: ICD-10-CM

## 2025-03-04 PROCEDURE — 99204 OFFICE O/P NEW MOD 45 MIN: CPT | Performed by: PHYSICAL MEDICINE & REHABILITATION

## 2025-03-04 RX ORDER — MULTIVIT WITH MINERALS/HERBS
1 TABLET ORAL DAILY
COMMUNITY
Start: 2024-02-19

## 2025-03-04 NOTE — PROGRESS NOTES
Offered to refer the pt to a neurosurgeon pt declined, she would like to follow up with her neurology.  I will see the patient back as needed.       Written by Mirtha Wallace, medical scribe, as dictated by Roque Burch MD  I examined the patient, reviewed and agree with the note.